# Patient Record
Sex: FEMALE | Race: OTHER | Employment: PART TIME | URBAN - METROPOLITAN AREA
[De-identification: names, ages, dates, MRNs, and addresses within clinical notes are randomized per-mention and may not be internally consistent; named-entity substitution may affect disease eponyms.]

---

## 2019-09-18 RX ORDER — CEFAZOLIN SODIUM/WATER 2 G/20 ML
2 SYRINGE (ML) INTRAVENOUS ONCE
Status: CANCELLED | OUTPATIENT
Start: 2019-09-23

## 2019-09-19 RX ORDER — HYDROCODONE BITARTRATE AND ACETAMINOPHEN 5; 325 MG/1; MG/1
1 TABLET ORAL EVERY 4 HOURS PRN
Status: ON HOLD | COMMUNITY
End: 2019-09-23

## 2019-09-19 RX ORDER — CEPHALEXIN 500 MG/1
500 CAPSULE ORAL 4 TIMES DAILY
COMMUNITY

## 2019-09-19 NOTE — H&P
ORTHO SURGERY H&P  Tonya Arana is a 21year old female. MRN is [id number]. CC: Right ankle pain    HPI: Ms. Mike Sotelo is a 25-year-old female training for the [employer] who presents complaining of right ankle pain.  She injured her right ankle in [place] AP, lateral, and mortise views demonstrate a fracture dislocation of the right ankle with lateral displacement of the talus and significant displacement of the fibula.     Post-reduction x-rays of the right ankle demonstrate significant improvement in the r

## 2019-09-23 ENCOUNTER — APPOINTMENT (OUTPATIENT)
Dept: GENERAL RADIOLOGY | Facility: HOSPITAL | Age: 24
End: 2019-09-23
Attending: ORTHOPAEDIC SURGERY
Payer: COMMERCIAL

## 2019-09-23 ENCOUNTER — HOSPITAL ENCOUNTER (OUTPATIENT)
Facility: HOSPITAL | Age: 24
Setting detail: HOSPITAL OUTPATIENT SURGERY
Discharge: HOME | End: 2019-09-23
Attending: ORTHOPAEDIC SURGERY | Admitting: ORTHOPAEDIC SURGERY
Payer: COMMERCIAL

## 2019-09-23 ENCOUNTER — ANESTHESIA (OUTPATIENT)
Dept: SURGERY | Facility: HOSPITAL | Age: 24
End: 2019-09-23
Payer: COMMERCIAL

## 2019-09-23 ENCOUNTER — ANESTHESIA EVENT (OUTPATIENT)
Dept: SURGERY | Facility: HOSPITAL | Age: 24
End: 2019-09-23
Payer: COMMERCIAL

## 2019-09-23 VITALS
BODY MASS INDEX: 45.2 KG/M2 | OXYGEN SATURATION: 95 % | SYSTOLIC BLOOD PRESSURE: 142 MMHG | DIASTOLIC BLOOD PRESSURE: 89 MMHG | HEART RATE: 91 BPM | TEMPERATURE: 98 F | HEIGHT: 67 IN | RESPIRATION RATE: 16 BRPM | WEIGHT: 288 LBS

## 2019-09-23 LAB
B-HCG UR QL: NEGATIVE
HCG SERPL QL: NEGATIVE

## 2019-09-23 PROCEDURE — 84703 CHORIONIC GONADOTROPIN ASSAY: CPT | Performed by: ORTHOPAEDIC SURGERY

## 2019-09-23 PROCEDURE — 0QSJ04Z REPOSITION RIGHT FIBULA WITH INTERNAL FIXATION DEVICE, OPEN APPROACH: ICD-10-PCS | Performed by: ORTHOPAEDIC SURGERY

## 2019-09-23 PROCEDURE — 36415 COLL VENOUS BLD VENIPUNCTURE: CPT | Performed by: ORTHOPAEDIC SURGERY

## 2019-09-23 PROCEDURE — 0SSF34Z REPOSITION RIGHT ANKLE JOINT WITH INTERNAL FIXATION DEVICE, PERCUTANEOUS APPROACH: ICD-10-PCS | Performed by: ORTHOPAEDIC SURGERY

## 2019-09-23 PROCEDURE — 76000 FLUOROSCOPY <1 HR PHYS/QHP: CPT | Performed by: ORTHOPAEDIC SURGERY

## 2019-09-23 PROCEDURE — 81025 URINE PREGNANCY TEST: CPT

## 2019-09-23 DEVICE — PLATE LCP TUB 1/3 8H 241.381: Type: IMPLANTABLE DEVICE | Site: ANKLE | Status: FUNCTIONAL

## 2019-09-23 DEVICE — IMPLANTABLE DEVICE: Type: IMPLANTABLE DEVICE | Site: ANKLE | Status: FUNCTIONAL

## 2019-09-23 DEVICE — SCREW CANC FT 4.0X12 206.012: Type: IMPLANTABLE DEVICE | Site: ANKLE | Status: FUNCTIONAL

## 2019-09-23 DEVICE — SCREW CANC FT 4.0X14 206.014: Type: IMPLANTABLE DEVICE | Site: ANKLE | Status: FUNCTIONAL

## 2019-09-23 RX ORDER — ACETAMINOPHEN 500 MG
1000 TABLET ORAL ONCE
Status: COMPLETED | OUTPATIENT
Start: 2019-09-23 | End: 2019-09-23

## 2019-09-23 RX ORDER — HYDROCODONE BITARTRATE AND ACETAMINOPHEN 5; 325 MG/1; MG/1
2 TABLET ORAL AS NEEDED
Status: DISCONTINUED | OUTPATIENT
Start: 2019-09-23 | End: 2019-09-23

## 2019-09-23 RX ORDER — MIDAZOLAM HYDROCHLORIDE 1 MG/ML
INJECTION INTRAMUSCULAR; INTRAVENOUS AS NEEDED
Status: DISCONTINUED | OUTPATIENT
Start: 2019-09-23 | End: 2019-09-23 | Stop reason: SURG

## 2019-09-23 RX ORDER — FAMOTIDINE 20 MG/1
20 TABLET ORAL ONCE
Status: COMPLETED | OUTPATIENT
Start: 2019-09-23 | End: 2019-09-23

## 2019-09-23 RX ORDER — MORPHINE SULFATE 10 MG/ML
6 INJECTION, SOLUTION INTRAMUSCULAR; INTRAVENOUS EVERY 10 MIN PRN
Status: DISCONTINUED | OUTPATIENT
Start: 2019-09-23 | End: 2019-09-23

## 2019-09-23 RX ORDER — PROCHLORPERAZINE EDISYLATE 5 MG/ML
5 INJECTION INTRAMUSCULAR; INTRAVENOUS ONCE AS NEEDED
Status: COMPLETED | OUTPATIENT
Start: 2019-09-23 | End: 2019-09-23

## 2019-09-23 RX ORDER — IBUPROFEN 600 MG/1
600 TABLET ORAL EVERY 8 HOURS PRN
Status: DISCONTINUED | OUTPATIENT
Start: 2019-09-25 | End: 2019-09-23

## 2019-09-23 RX ORDER — CLINDAMYCIN PHOSPHATE 150 MG/ML
INJECTION, SOLUTION INTRAVENOUS AS NEEDED
Status: DISCONTINUED | OUTPATIENT
Start: 2019-09-23 | End: 2019-09-23 | Stop reason: SURG

## 2019-09-23 RX ORDER — SODIUM CHLORIDE, SODIUM LACTATE, POTASSIUM CHLORIDE, CALCIUM CHLORIDE 600; 310; 30; 20 MG/100ML; MG/100ML; MG/100ML; MG/100ML
INJECTION, SOLUTION INTRAVENOUS CONTINUOUS
Status: DISCONTINUED | OUTPATIENT
Start: 2019-09-23 | End: 2019-09-23

## 2019-09-23 RX ORDER — MORPHINE SULFATE 4 MG/ML
4 INJECTION, SOLUTION INTRAMUSCULAR; INTRAVENOUS EVERY 10 MIN PRN
Status: DISCONTINUED | OUTPATIENT
Start: 2019-09-23 | End: 2019-09-23

## 2019-09-23 RX ORDER — CLINDAMYCIN PHOSPHATE 900 MG/50ML
900 INJECTION INTRAVENOUS ONCE
Status: DISCONTINUED | OUTPATIENT
Start: 2019-09-23 | End: 2019-09-23 | Stop reason: HOSPADM

## 2019-09-23 RX ORDER — SODIUM CHLORIDE 9 MG/ML
INJECTION, SOLUTION INTRAVENOUS CONTINUOUS PRN
Status: DISCONTINUED | OUTPATIENT
Start: 2019-09-23 | End: 2019-09-23 | Stop reason: SURG

## 2019-09-23 RX ORDER — OXYCODONE HYDROCHLORIDE AND ACETAMINOPHEN 5; 325 MG/1; MG/1
2 TABLET ORAL EVERY 4 HOURS PRN
Status: DISCONTINUED | OUTPATIENT
Start: 2019-09-23 | End: 2019-09-23

## 2019-09-23 RX ORDER — OXYCODONE HYDROCHLORIDE AND ACETAMINOPHEN 5; 325 MG/1; MG/1
1-2 TABLET ORAL EVERY 4 HOURS PRN
Qty: 25 TABLET | Refills: 0 | Status: SHIPPED | OUTPATIENT
Start: 2019-09-23

## 2019-09-23 RX ORDER — HYDROMORPHONE HYDROCHLORIDE 1 MG/ML
0.6 INJECTION, SOLUTION INTRAMUSCULAR; INTRAVENOUS; SUBCUTANEOUS EVERY 5 MIN PRN
Status: DISCONTINUED | OUTPATIENT
Start: 2019-09-23 | End: 2019-09-23

## 2019-09-23 RX ORDER — HYDROMORPHONE HYDROCHLORIDE 1 MG/ML
INJECTION, SOLUTION INTRAMUSCULAR; INTRAVENOUS; SUBCUTANEOUS AS NEEDED
Status: DISCONTINUED | OUTPATIENT
Start: 2019-09-23 | End: 2019-09-23 | Stop reason: SURG

## 2019-09-23 RX ORDER — NALOXONE HYDROCHLORIDE 0.4 MG/ML
80 INJECTION, SOLUTION INTRAMUSCULAR; INTRAVENOUS; SUBCUTANEOUS AS NEEDED
Status: DISCONTINUED | OUTPATIENT
Start: 2019-09-23 | End: 2019-09-23

## 2019-09-23 RX ORDER — ONDANSETRON 2 MG/ML
4 INJECTION INTRAMUSCULAR; INTRAVENOUS ONCE AS NEEDED
Status: COMPLETED | OUTPATIENT
Start: 2019-09-23 | End: 2019-09-23

## 2019-09-23 RX ORDER — HYDROCODONE BITARTRATE AND ACETAMINOPHEN 5; 325 MG/1; MG/1
1 TABLET ORAL AS NEEDED
Status: DISCONTINUED | OUTPATIENT
Start: 2019-09-23 | End: 2019-09-23

## 2019-09-23 RX ORDER — BUPIVACAINE HYDROCHLORIDE AND EPINEPHRINE 2.5; 5 MG/ML; UG/ML
INJECTION, SOLUTION INFILTRATION; PERINEURAL AS NEEDED
Status: DISCONTINUED | OUTPATIENT
Start: 2019-09-23 | End: 2019-09-23 | Stop reason: HOSPADM

## 2019-09-23 RX ORDER — METOCLOPRAMIDE 10 MG/1
10 TABLET ORAL ONCE
Status: COMPLETED | OUTPATIENT
Start: 2019-09-23 | End: 2019-09-23

## 2019-09-23 RX ORDER — HYDROMORPHONE HYDROCHLORIDE 1 MG/ML
0.4 INJECTION, SOLUTION INTRAMUSCULAR; INTRAVENOUS; SUBCUTANEOUS EVERY 5 MIN PRN
Status: DISCONTINUED | OUTPATIENT
Start: 2019-09-23 | End: 2019-09-23

## 2019-09-23 RX ORDER — DEXAMETHASONE SODIUM PHOSPHATE 4 MG/ML
VIAL (ML) INJECTION AS NEEDED
Status: DISCONTINUED | OUTPATIENT
Start: 2019-09-23 | End: 2019-09-23 | Stop reason: SURG

## 2019-09-23 RX ORDER — KETOROLAC TROMETHAMINE 30 MG/ML
30 INJECTION, SOLUTION INTRAMUSCULAR; INTRAVENOUS EVERY 6 HOURS PRN
Status: DISCONTINUED | OUTPATIENT
Start: 2019-09-23 | End: 2019-09-23

## 2019-09-23 RX ORDER — IBUPROFEN 600 MG/1
600 TABLET ORAL EVERY 8 HOURS PRN
Status: DISCONTINUED | OUTPATIENT
Start: 2019-09-23 | End: 2019-09-23 | Stop reason: SDUPTHER

## 2019-09-23 RX ORDER — HYDROMORPHONE HYDROCHLORIDE 1 MG/ML
0.2 INJECTION, SOLUTION INTRAMUSCULAR; INTRAVENOUS; SUBCUTANEOUS EVERY 5 MIN PRN
Status: DISCONTINUED | OUTPATIENT
Start: 2019-09-23 | End: 2019-09-23

## 2019-09-23 RX ORDER — MORPHINE SULFATE 4 MG/ML
2 INJECTION, SOLUTION INTRAMUSCULAR; INTRAVENOUS EVERY 10 MIN PRN
Status: DISCONTINUED | OUTPATIENT
Start: 2019-09-23 | End: 2019-09-23

## 2019-09-23 RX ORDER — ONDANSETRON 2 MG/ML
INJECTION INTRAMUSCULAR; INTRAVENOUS AS NEEDED
Status: DISCONTINUED | OUTPATIENT
Start: 2019-09-23 | End: 2019-09-23 | Stop reason: SURG

## 2019-09-23 RX ORDER — LIDOCAINE HYDROCHLORIDE 10 MG/ML
INJECTION, SOLUTION EPIDURAL; INFILTRATION; INTRACAUDAL; PERINEURAL AS NEEDED
Status: DISCONTINUED | OUTPATIENT
Start: 2019-09-23 | End: 2019-09-23 | Stop reason: SURG

## 2019-09-23 RX ADMIN — HYDROMORPHONE HYDROCHLORIDE 0.5 MG: 1 INJECTION, SOLUTION INTRAMUSCULAR; INTRAVENOUS; SUBCUTANEOUS at 15:07:00

## 2019-09-23 RX ADMIN — HYDROMORPHONE HYDROCHLORIDE 0.5 MG: 1 INJECTION, SOLUTION INTRAMUSCULAR; INTRAVENOUS; SUBCUTANEOUS at 15:12:00

## 2019-09-23 RX ADMIN — SODIUM CHLORIDE, SODIUM LACTATE, POTASSIUM CHLORIDE, CALCIUM CHLORIDE: 600; 310; 30; 20 INJECTION, SOLUTION INTRAVENOUS at 13:40:00

## 2019-09-23 RX ADMIN — SODIUM CHLORIDE, SODIUM LACTATE, POTASSIUM CHLORIDE, CALCIUM CHLORIDE: 600; 310; 30; 20 INJECTION, SOLUTION INTRAVENOUS at 15:12:00

## 2019-09-23 RX ADMIN — CLINDAMYCIN PHOSPHATE 900 MG: 150 INJECTION, SOLUTION INTRAVENOUS at 14:00:00

## 2019-09-23 RX ADMIN — MIDAZOLAM HYDROCHLORIDE 2 MG: 1 INJECTION INTRAMUSCULAR; INTRAVENOUS at 13:45:00

## 2019-09-23 RX ADMIN — DEXAMETHASONE SODIUM PHOSPHATE 4 MG: 4 MG/ML VIAL (ML) INJECTION at 13:59:00

## 2019-09-23 RX ADMIN — SODIUM CHLORIDE: 9 INJECTION, SOLUTION INTRAVENOUS at 15:12:00

## 2019-09-23 RX ADMIN — ONDANSETRON 4 MG: 2 INJECTION INTRAMUSCULAR; INTRAVENOUS at 13:59:00

## 2019-09-23 RX ADMIN — LIDOCAINE HYDROCHLORIDE 50 MG: 10 INJECTION, SOLUTION EPIDURAL; INFILTRATION; INTRACAUDAL; PERINEURAL at 13:49:00

## 2019-09-23 NOTE — OPERATIVE REPORT
Operative Note    Patient Name: Edgar Ayala    Preoperative Diagnosis: right ankle distal fibula fracture, displaced, syndesmotic ligament tear    Postoperative Diagnosis: right ankle distal fibula fracture, displaced, syndesmotic ligament tear    Primar

## 2019-09-23 NOTE — ANESTHESIA PREPROCEDURE EVALUATION
Anesthesia PreOp Note    HPI:     Alli Walker is a 21year old female who presents for preoperative consultation requested by: Steffi Heredia MD    Date of Surgery: 9/23/2019    Procedure(s):  ANKLE OPEN REDUCTION INTERNAL FIXATION  Indication: right ondansetron HCl (ZOFRAN) injection  Intravenous PRN Brain [place], CRNA 4 mg at 09/23/19 1359    clindamycin phosphate (CLEOCIN) injection   PRN [place], CRNA 900 mg at 09/23/19 1400    propofol (DIPRIVAN) infusion   Continuous PRN Brain [place], CRNA on file      Intimate partner violence:        Fear of current or ex partner: Not on file        Emotionally abused: Not on file        Physically abused: Not on file        Forced sexual activity: Not on file    Other Topics      Concerns:        Not on f D  9/23/2019 2:45 PM

## 2019-09-23 NOTE — ANESTHESIA POSTPROCEDURE EVALUATION
Patient: Denton Benson    Procedure Summary     Date:  09/23/19 Room / Location:  [facility]  / [facility]    Anesthesia Start:  9892 Anesthesia Stop:  9757    Procedure:  ANKLE OPEN REDUCTION INTERNAL FIXATION (Right Ankle) Diagnosis:  (right ankle di

## 2019-09-24 NOTE — OPERATIVE REPORT
[facility]    PATIENT'S NAME: [facility]   ATTENDING PHYSICIAN: Sina Lofton MD   OPERATING PHYSICIAN: Sina Lofton MD   PATIENT ACCOUNT#:   [id number]    LOCATION:  [facility] 3 [facility] 10  MEDICAL RECORD #:   [id number]       DATE longitudinal incision was made over the lateral aspect of the ankle over the distal fibula. Sharp dissection was carried out down to periosteal bone. The fracture site was identified and hematoma was removed from the fracture.   A bone-holding clamp was t portions of the procedure. There were no complications. We placed 20 mL of 0.5% Marcaine within the joint and around the incision laterally. Dictated By Alex Mosqueda.  Jaden Sen MD  d: 09/23/2019 14:44:29  t: 09/23/2019 22:00:19  [facility] [id number]  RACHIDO/C

## 2019-12-16 ENCOUNTER — HOSPITAL ENCOUNTER (EMERGENCY)
Facility: HOSPITAL | Age: 24
Discharge: HOME | End: 2019-12-16
Payer: COMMERCIAL

## 2019-12-16 VITALS
OXYGEN SATURATION: 99 % | BODY MASS INDEX: 35.36 KG/M2 | SYSTOLIC BLOOD PRESSURE: 162 MMHG | TEMPERATURE: 98 F | HEIGHT: 66 IN | RESPIRATION RATE: 18 BRPM | HEART RATE: 78 BPM | WEIGHT: 220 LBS | DIASTOLIC BLOOD PRESSURE: 66 MMHG
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
## 2019-12-16 DIAGNOSIS — S30.861A INSECT BITE OF ABDOMINAL WALL, INITIAL ENCOUNTER: Primary | ICD-10-CM

## 2019-12-16 PROCEDURE — 99283 EMERGENCY DEPT VISIT LOW MDM: CPT

## 2019-12-16 RX ORDER — CEPHALEXIN 500 MG/1
500 CAPSULE ORAL 4 TIMES DAILY
Qty: 28 CAPSULE | Refills: 0 | Status: SHIPPED | OUTPATIENT
Start: 2019-12-16 | End: 2019-12-23

## 2019-12-16 NOTE — ED PROVIDER NOTES
Patient Seen in: [facility] AND [facility] Emergency Department      History   Patient presents with:  Bite Sting,Insect    Stated Complaint: LLQ pain and skin abrasion.      HPI    24-year-old female presents to the emergency department with complaints of an itc refill takes less than 2 seconds. Comments:  There is a 1 cm annular erythematous lesion to the left lower abdomen suspicious for insect sting versus spider bite no abscess or fluctuance mild excoriation to the mid aspect of the wound from scratching

## 2019-12-16 NOTE — ED NOTES
Assumed care of patient from triage. Patient to ED from home for left abdominal wound. Patient states that she had been having the wound x2 days, which started off looking like a bruise.  Patient states that she started \"pcking at it\" today which made the

## 2022-02-01 ENCOUNTER — LAB REQUISITION (OUTPATIENT)
Dept: SURGERY | Age: 27
End: 2022-02-01
Payer: COMMERCIAL

## 2022-02-02 LAB — SARS-COV-2 RNA RESP QL NAA+PROBE: NOT DETECTED

## 2022-02-04 ENCOUNTER — LAB REQUISITION (OUTPATIENT)
Dept: SURGERY | Age: 27
End: 2022-02-04
Payer: COMMERCIAL

## 2022-02-04 PROCEDURE — 88304 TISSUE EXAM BY PATHOLOGIST: CPT | Performed by: SURGERY

## 2023-01-19 ENCOUNTER — PATIENT MESSAGE (OUTPATIENT)
Dept: INTERNAL MEDICINE CLINIC | Facility: CLINIC | Age: 28
End: 2023-01-19

## 2023-02-01 ENCOUNTER — LAB ENCOUNTER (OUTPATIENT)
Dept: LAB | Facility: HOSPITAL | Age: 28
End: 2023-02-01
Attending: FAMILY MEDICINE
Payer: COMMERCIAL

## 2023-02-01 ENCOUNTER — OFFICE VISIT (OUTPATIENT)
Dept: INTERNAL MEDICINE CLINIC | Facility: CLINIC | Age: 28
End: 2023-02-01
Payer: COMMERCIAL

## 2023-02-01 ENCOUNTER — LAB ENCOUNTER (OUTPATIENT)
Dept: LAB | Facility: REFERENCE LAB | Age: 28
End: 2023-02-01
Attending: FAMILY MEDICINE
Payer: COMMERCIAL

## 2023-02-01 VITALS
HEIGHT: 65.35 IN | TEMPERATURE: 98 F | OXYGEN SATURATION: 98 % | DIASTOLIC BLOOD PRESSURE: 70 MMHG | BODY MASS INDEX: 45.49 KG/M2 | WEIGHT: 276.38 LBS | HEART RATE: 82 BPM | SYSTOLIC BLOOD PRESSURE: 124 MMHG

## 2023-02-01 DIAGNOSIS — Z71.3 WEIGHT LOSS COUNSELING, ENCOUNTER FOR: Primary | ICD-10-CM

## 2023-02-01 DIAGNOSIS — Z71.3 WEIGHT LOSS COUNSELING, ENCOUNTER FOR: ICD-10-CM

## 2023-02-01 DIAGNOSIS — Z23 NEED FOR VACCINATION: ICD-10-CM

## 2023-02-01 DIAGNOSIS — Z00.00 HEALTHCARE MAINTENANCE: ICD-10-CM

## 2023-02-01 LAB
ALBUMIN SERPL-MCNC: 3.4 G/DL (ref 3.4–5)
ALBUMIN/GLOB SERPL: 0.7 {RATIO} (ref 1–2)
ALP LIVER SERPL-CCNC: 139 U/L
ALT SERPL-CCNC: 51 U/L
ANION GAP SERPL CALC-SCNC: 7 MMOL/L (ref 0–18)
AST SERPL-CCNC: 23 U/L (ref 15–37)
ATRIAL RATE: 75 BPM
BASOPHILS # BLD AUTO: 0.05 X10(3) UL (ref 0–0.2)
BASOPHILS NFR BLD AUTO: 0.6 %
BILIRUB SERPL-MCNC: 0.3 MG/DL (ref 0.1–2)
BUN BLD-MCNC: 11 MG/DL (ref 7–18)
BUN/CREAT SERPL: 15.7 (ref 10–20)
CALCIUM BLD-MCNC: 9.1 MG/DL (ref 8.5–10.1)
CHLORIDE SERPL-SCNC: 106 MMOL/L (ref 98–112)
CHOLEST SERPL-MCNC: 167 MG/DL (ref ?–200)
CO2 SERPL-SCNC: 27 MMOL/L (ref 21–32)
CREAT BLD-MCNC: 0.7 MG/DL
DEPRECATED RDW RBC AUTO: 38.8 FL (ref 35.1–46.3)
EOSINOPHIL # BLD AUTO: 0.37 X10(3) UL (ref 0–0.7)
EOSINOPHIL NFR BLD AUTO: 4.2 %
ERYTHROCYTE [DISTWIDTH] IN BLOOD BY AUTOMATED COUNT: 12.2 % (ref 11–15)
EST. AVERAGE GLUCOSE BLD GHB EST-MCNC: 100 MG/DL (ref 68–126)
FASTING PATIENT LIPID ANSWER: YES
FASTING STATUS PATIENT QL REPORTED: YES
GFR SERPLBLD BASED ON 1.73 SQ M-ARVRAT: 121 ML/MIN/1.73M2 (ref 60–?)
GLOBULIN PLAS-MCNC: 4.6 G/DL (ref 2.8–4.4)
GLUCOSE BLD-MCNC: 109 MG/DL (ref 70–99)
HBA1C MFR BLD: 5.1 % (ref ?–5.7)
HCT VFR BLD AUTO: 41.2 %
HDLC SERPL-MCNC: 38 MG/DL (ref 40–59)
HGB BLD-MCNC: 14.1 G/DL
IMM GRANULOCYTES # BLD AUTO: 0.03 X10(3) UL (ref 0–1)
IMM GRANULOCYTES NFR BLD: 0.3 %
LDLC SERPL CALC-MCNC: 95 MG/DL (ref ?–100)
LYMPHOCYTES # BLD AUTO: 2.45 X10(3) UL (ref 1–4)
LYMPHOCYTES NFR BLD AUTO: 27.7 %
MCH RBC QN AUTO: 29.6 PG (ref 26–34)
MCHC RBC AUTO-ENTMCNC: 34.2 G/DL (ref 31–37)
MCV RBC AUTO: 86.6 FL
MONOCYTES # BLD AUTO: 0.52 X10(3) UL (ref 0.1–1)
MONOCYTES NFR BLD AUTO: 5.9 %
NEUTROPHILS # BLD AUTO: 5.44 X10 (3) UL (ref 1.5–7.7)
NEUTROPHILS # BLD AUTO: 5.44 X10(3) UL (ref 1.5–7.7)
NEUTROPHILS NFR BLD AUTO: 61.3 %
NONHDLC SERPL-MCNC: 129 MG/DL (ref ?–130)
OSMOLALITY SERPL CALC.SUM OF ELEC: 290 MOSM/KG (ref 275–295)
P AXIS: 28 DEGREES
P-R INTERVAL: 164 MS
PLATELET # BLD AUTO: 310 10(3)UL (ref 150–450)
POTASSIUM SERPL-SCNC: 3.4 MMOL/L (ref 3.5–5.1)
PROT SERPL-MCNC: 8 G/DL (ref 6.4–8.2)
Q-T INTERVAL: 370 MS
QRS DURATION: 84 MS
QTC CALCULATION (BEZET): 413 MS
R AXIS: 45 DEGREES
RBC # BLD AUTO: 4.76 X10(6)UL
SODIUM SERPL-SCNC: 140 MMOL/L (ref 136–145)
T AXIS: 16 DEGREES
TRIGL SERPL-MCNC: 197 MG/DL (ref 30–149)
TSI SER-ACNC: 1.13 MIU/ML (ref 0.36–3.74)
VENTRICULAR RATE: 75 BPM
VLDLC SERPL CALC-MCNC: 32 MG/DL (ref 0–30)
WBC # BLD AUTO: 8.9 X10(3) UL (ref 4–11)

## 2023-02-01 PROCEDURE — 93010 ELECTROCARDIOGRAM REPORT: CPT | Performed by: INTERNAL MEDICINE

## 2023-02-01 PROCEDURE — 3074F SYST BP LT 130 MM HG: CPT | Performed by: FAMILY MEDICINE

## 2023-02-01 PROCEDURE — 80061 LIPID PANEL: CPT

## 2023-02-01 PROCEDURE — 93005 ELECTROCARDIOGRAM TRACING: CPT

## 2023-02-01 PROCEDURE — 3078F DIAST BP <80 MM HG: CPT | Performed by: FAMILY MEDICINE

## 2023-02-01 PROCEDURE — 90686 IIV4 VACC NO PRSV 0.5 ML IM: CPT | Performed by: FAMILY MEDICINE

## 2023-02-01 PROCEDURE — 36415 COLL VENOUS BLD VENIPUNCTURE: CPT

## 2023-02-01 PROCEDURE — 84443 ASSAY THYROID STIM HORMONE: CPT

## 2023-02-01 PROCEDURE — 85025 COMPLETE CBC W/AUTO DIFF WBC: CPT | Performed by: FAMILY MEDICINE

## 2023-02-01 PROCEDURE — 83036 HEMOGLOBIN GLYCOSYLATED A1C: CPT

## 2023-02-01 PROCEDURE — 80053 COMPREHEN METABOLIC PANEL: CPT

## 2023-02-01 PROCEDURE — 3008F BODY MASS INDEX DOCD: CPT | Performed by: FAMILY MEDICINE

## 2023-02-01 PROCEDURE — 99204 OFFICE O/P NEW MOD 45 MIN: CPT | Performed by: FAMILY MEDICINE

## 2023-02-01 RX ORDER — LORATADINE 10 MG/1
10 TABLET ORAL DAILY
COMMUNITY

## 2023-02-10 NOTE — TELEPHONE ENCOUNTER
Fax received from [place] requesting PA submission for Phentermine 15 mg Rx.   PA form submitted to [facility] via fax ( [phone])

## 2023-02-21 ENCOUNTER — TELEPHONE (OUTPATIENT)
Dept: INTERNAL MEDICINE CLINIC | Facility: CLINIC | Age: 28
End: 2023-02-21

## 2023-02-21 NOTE — TELEPHONE ENCOUNTER
Fax received from [employer] re:  Notice of Prior Auth denial for Phentermine 15 mg capsules Rx. [employer] Message sent to patient re: denial by insurance  As well as option to use [facility] and purchase out of pocket.

## 2023-03-02 ENCOUNTER — OFFICE VISIT (OUTPATIENT)
Dept: SURGERY | Facility: CLINIC | Age: 28
End: 2023-03-02
Payer: COMMERCIAL

## 2023-03-02 ENCOUNTER — OFFICE VISIT (OUTPATIENT)
Dept: INTERNAL MEDICINE CLINIC | Facility: CLINIC | Age: 28
End: 2023-03-02
Payer: COMMERCIAL

## 2023-03-02 VITALS
WEIGHT: 274 LBS | BODY MASS INDEX: 46.21 KG/M2 | DIASTOLIC BLOOD PRESSURE: 86 MMHG | HEIGHT: 64.4 IN | SYSTOLIC BLOOD PRESSURE: 127 MMHG | HEART RATE: 98 BPM | OXYGEN SATURATION: 99 %

## 2023-03-02 VITALS
OXYGEN SATURATION: 98 % | SYSTOLIC BLOOD PRESSURE: 122 MMHG | TEMPERATURE: 98 F | BODY MASS INDEX: 45.43 KG/M2 | HEIGHT: 65.35 IN | HEART RATE: 74 BPM | WEIGHT: 276 LBS | DIASTOLIC BLOOD PRESSURE: 78 MMHG

## 2023-03-02 DIAGNOSIS — E66.01 MORBID OBESITY WITH BMI OF 45.0-49.9, ADULT (HCC): ICD-10-CM

## 2023-03-02 DIAGNOSIS — J45.990 EXERCISE INDUCED BRONCHOSPASM: ICD-10-CM

## 2023-03-02 DIAGNOSIS — J30.2 SEASONAL ALLERGIC RHINITIS, UNSPECIFIED TRIGGER: ICD-10-CM

## 2023-03-02 DIAGNOSIS — E78.5 DYSLIPIDEMIA: Primary | ICD-10-CM

## 2023-03-02 DIAGNOSIS — R06.83 SNORING: ICD-10-CM

## 2023-03-02 DIAGNOSIS — R63.2 BINGE EATING: ICD-10-CM

## 2023-03-02 DIAGNOSIS — Z00.00 WELLNESS EXAMINATION: Primary | ICD-10-CM

## 2023-03-02 DIAGNOSIS — Z12.4 PAP SMEAR FOR CERVICAL CANCER SCREENING: ICD-10-CM

## 2023-03-02 DIAGNOSIS — Z51.81 ENCOUNTER FOR THERAPEUTIC DRUG MONITORING: ICD-10-CM

## 2023-03-02 PROCEDURE — 3079F DIAST BP 80-89 MM HG: CPT | Performed by: NURSE PRACTITIONER

## 2023-03-02 PROCEDURE — 88175 CYTOPATH C/V AUTO FLUID REDO: CPT | Performed by: FAMILY MEDICINE

## 2023-03-02 PROCEDURE — 3008F BODY MASS INDEX DOCD: CPT | Performed by: NURSE PRACTITIONER

## 2023-03-02 PROCEDURE — 99212 OFFICE O/P EST SF 10 MIN: CPT | Performed by: FAMILY MEDICINE

## 2023-03-02 PROCEDURE — 3074F SYST BP LT 130 MM HG: CPT | Performed by: FAMILY MEDICINE

## 2023-03-02 PROCEDURE — 99395 PREV VISIT EST AGE 18-39: CPT | Performed by: FAMILY MEDICINE

## 2023-03-02 PROCEDURE — 3078F DIAST BP <80 MM HG: CPT | Performed by: FAMILY MEDICINE

## 2023-03-02 PROCEDURE — 3008F BODY MASS INDEX DOCD: CPT | Performed by: FAMILY MEDICINE

## 2023-03-02 PROCEDURE — 99204 OFFICE O/P NEW MOD 45 MIN: CPT | Performed by: NURSE PRACTITIONER

## 2023-03-02 PROCEDURE — 3074F SYST BP LT 130 MM HG: CPT | Performed by: NURSE PRACTITIONER

## 2023-03-02 RX ORDER — FLUTICASONE PROPIONATE 50 MCG
2 SPRAY, SUSPENSION (ML) NASAL DAILY
Qty: 1 EACH | Refills: 0 | Status: SHIPPED | OUTPATIENT
Start: 2023-03-02 | End: 2024-02-25

## 2023-03-02 RX ORDER — ALBUTEROL SULFATE 90 UG/1
2 AEROSOL, METERED RESPIRATORY (INHALATION) EVERY 6 HOURS PRN
Qty: 18 G | Refills: 0 | Status: SHIPPED | OUTPATIENT
Start: 2023-03-02

## 2023-03-02 RX ORDER — MONTELUKAST SODIUM 10 MG/1
10 TABLET ORAL NIGHTLY PRN
Qty: 90 TABLET | Refills: 1 | Status: SHIPPED | OUTPATIENT
Start: 2023-03-02

## 2023-03-02 NOTE — PATIENT INSTRUCTIONS
Continue phentermine. [facility] schedule:    0.25 mg/week x 4 weeks  0.5 mg/week x 4 weeks  1 mg/week x 4 weeks  1.7 mg/week x 4 weeks  2.4 mg/week thereafter    I recommend a whole food, plant powered diet with low glycemic index:     Aim for 3 meals a day and 1-2 snacks as needed. Aim for a protein + produce at each meal time. Keep meals between 7 am and 7 pm.     Breakfast ideas:  1. Fruit and nuts/seeds. 2. Eggs scrambled with vegetables. 3. Oatmeal (stovetop), cinnamon, 2 tbsp flaxseed, berries, nuts. 4. Protein shake + fruit. (1 scoop with water/ice). [employer] , [place], [place], and [place] are good brands. Snacks:  Raw vegetables and hummus, apples and peanut butter, nuts, seeds, fruit, pecans drizzled with organic honey. Use the Healthy Plate method for lunch and dinner:  1/2 right side of plate non-starchy vegetables. Bottom left 1/4 plate protein. Top left 1/4 starch as desired. Aim for a total of:  2 fruits a day (avocado, tomato, citrus/oranges, apples, berries)  1/2 cup or medium size    4 non-starchy vegetables (handful) (greens, peppers, onions, garlic, broccoli, cauliflower, etc.)    0-2 starches (oatmeal, sweet potato, carrots, brown rice, etc). 3 protein per day (fish, seafood, meat, or plants: salmon, nuts, seeds, shrimp, chicken, turkey, beans, lentils, chickpeas, etc).     2 healthy fats (avocado, avocado oil, olives, olive oil, salmon, nuts, seeds)

## 2023-03-23 ENCOUNTER — TELEPHONE (OUTPATIENT)
Dept: SURGERY | Facility: CLINIC | Age: 28
End: 2023-03-23

## 2023-04-04 ENCOUNTER — TELEPHONE (OUTPATIENT)
Dept: SURGERY | Facility: CLINIC | Age: 28
End: 2023-04-04

## 2023-05-17 ENCOUNTER — TELEMEDICINE (OUTPATIENT)
Dept: SURGERY | Facility: CLINIC | Age: 28
End: 2023-05-17
Payer: COMMERCIAL

## 2023-05-17 ENCOUNTER — TELEPHONE (OUTPATIENT)
Dept: SURGERY | Facility: CLINIC | Age: 28
End: 2023-05-17

## 2023-05-17 VITALS — WEIGHT: 267 LBS | BODY MASS INDEX: 45 KG/M2

## 2023-05-17 DIAGNOSIS — R06.83 SNORING: ICD-10-CM

## 2023-05-17 DIAGNOSIS — R63.2 BINGE EATING: ICD-10-CM

## 2023-05-17 DIAGNOSIS — Z51.81 ENCOUNTER FOR THERAPEUTIC DRUG MONITORING: ICD-10-CM

## 2023-05-17 DIAGNOSIS — E78.5 DYSLIPIDEMIA: Primary | ICD-10-CM

## 2023-05-17 DIAGNOSIS — E66.01 MORBID OBESITY WITH BMI OF 45.0-49.9, ADULT (HCC): ICD-10-CM

## 2023-05-17 PROCEDURE — 99214 OFFICE O/P EST MOD 30 MIN: CPT | Performed by: NURSE PRACTITIONER

## 2023-05-17 RX ORDER — PHENTERMINE HYDROCHLORIDE 37.5 MG/1
37.5 TABLET ORAL
Qty: 30 TABLET | Refills: 3 | Status: SHIPPED | OUTPATIENT
Start: 2023-05-17

## 2023-05-17 NOTE — PATIENT INSTRUCTIONS
Attend bariatric seminar. To schedule bariatric seminar:   Call [phone] or   Schedule Online at- [email or web address]    After you attend seminar, please reach out to the surgeon's office to have your insurance benefits verified. The number to call is [phone] and the office can direct you from there. The surgeon's office will not verify your benefits until you have attended seminar. Please do not call the surgeon until after seminar.

## 2023-06-07 RX ORDER — TOPIRAMATE 25 MG/1
25 TABLET ORAL DAILY
Qty: 30 TABLET | Refills: 2 | Status: SHIPPED | OUTPATIENT
Start: 2023-06-07

## 2023-06-23 DIAGNOSIS — R63.2 BINGE EATING: ICD-10-CM

## 2023-06-23 DIAGNOSIS — E78.5 DYSLIPIDEMIA: Primary | ICD-10-CM

## 2023-06-23 DIAGNOSIS — R06.83 SNORING: ICD-10-CM

## 2023-06-23 DIAGNOSIS — E66.01 MORBID OBESITY WITH BMI OF 45.0-49.9, ADULT (HCC): ICD-10-CM

## 2023-11-01 ENCOUNTER — TELEPHONE (OUTPATIENT)
Dept: INTERNAL MEDICINE CLINIC | Facility: CLINIC | Age: 28
End: 2023-11-01

## 2023-11-01 NOTE — TELEPHONE ENCOUNTER
Pt called stating that she has been noticing a little bit of blood on her stools  Pt stated that it does hurt when she goes to have a bowel  Pt would like to be seen soon  Please advise

## 2023-11-01 NOTE — TELEPHONE ENCOUNTER
Appointment scheduled for Monday patient was offered an appointment for tomorrow but states she can not call off work

## 2023-12-04 ENCOUNTER — OFFICE VISIT (OUTPATIENT)
Dept: INTERNAL MEDICINE CLINIC | Facility: CLINIC | Age: 28
End: 2023-12-04
Payer: COMMERCIAL

## 2023-12-04 VITALS
HEIGHT: 64.4 IN | BODY MASS INDEX: 46.27 KG/M2 | SYSTOLIC BLOOD PRESSURE: 120 MMHG | DIASTOLIC BLOOD PRESSURE: 82 MMHG | OXYGEN SATURATION: 100 % | WEIGHT: 274.38 LBS | HEART RATE: 85 BPM

## 2023-12-04 DIAGNOSIS — N89.8 VAGINAL ITCHING: ICD-10-CM

## 2023-12-04 DIAGNOSIS — R39.9 URINARY SYMPTOM OR SIGN: Primary | ICD-10-CM

## 2023-12-04 LAB
BILIRUB UR QL: NEGATIVE
COLOR UR: YELLOW
GLUCOSE UR-MCNC: NORMAL MG/DL
KETONES UR-MCNC: NEGATIVE MG/DL
LEUKOCYTE ESTERASE UR QL STRIP.AUTO: NEGATIVE
PH UR: 6 [PH] (ref 5–8)
SP GR UR STRIP: 1.03 (ref 1–1.03)
UROBILINOGEN UR STRIP-ACNC: NORMAL

## 2023-12-04 PROCEDURE — 81001 URINALYSIS AUTO W/SCOPE: CPT | Performed by: FAMILY MEDICINE

## 2023-12-04 PROCEDURE — 87186 SC STD MICRODIL/AGAR DIL: CPT | Performed by: FAMILY MEDICINE

## 2023-12-04 PROCEDURE — 87184 SC STD DISK METHOD PER PLATE: CPT | Performed by: FAMILY MEDICINE

## 2023-12-04 PROCEDURE — 3079F DIAST BP 80-89 MM HG: CPT | Performed by: FAMILY MEDICINE

## 2023-12-04 PROCEDURE — 87086 URINE CULTURE/COLONY COUNT: CPT | Performed by: FAMILY MEDICINE

## 2023-12-04 PROCEDURE — 87077 CULTURE AEROBIC IDENTIFY: CPT | Performed by: FAMILY MEDICINE

## 2023-12-04 PROCEDURE — 3074F SYST BP LT 130 MM HG: CPT | Performed by: FAMILY MEDICINE

## 2023-12-04 PROCEDURE — 3008F BODY MASS INDEX DOCD: CPT | Performed by: FAMILY MEDICINE

## 2023-12-04 PROCEDURE — 99214 OFFICE O/P EST MOD 30 MIN: CPT | Performed by: FAMILY MEDICINE

## 2023-12-04 RX ORDER — FLUCONAZOLE 150 MG/1
150 TABLET ORAL ONCE
Qty: 2 TABLET | Refills: 0 | Status: SHIPPED | OUTPATIENT
Start: 2023-12-04 | End: 2023-12-04

## 2024-03-11 ENCOUNTER — APPOINTMENT (OUTPATIENT)
Dept: GENERAL RADIOLOGY | Age: 29
End: 2024-03-11
Attending: NURSE PRACTITIONER
Payer: COMMERCIAL

## 2024-03-11 ENCOUNTER — HOSPITAL ENCOUNTER (OUTPATIENT)
Age: 29
Discharge: HOME | End: 2024-03-11
Payer: COMMERCIAL

## 2024-03-11 VITALS
DIASTOLIC BLOOD PRESSURE: 73 MMHG | TEMPERATURE: 97 F | SYSTOLIC BLOOD PRESSURE: 129 MMHG | RESPIRATION RATE: 16 BRPM | HEART RATE: 93 BPM | OXYGEN SATURATION: 100 %

## 2024-03-11 DIAGNOSIS — B34.9 VIRAL SYNDROME: ICD-10-CM

## 2024-03-11 DIAGNOSIS — R05.9 COUGH: Primary | ICD-10-CM

## 2024-03-11 DIAGNOSIS — B97.89 VIRAL LARYNGITIS: ICD-10-CM

## 2024-03-11 DIAGNOSIS — J04.0 VIRAL LARYNGITIS: ICD-10-CM

## 2024-03-11 DIAGNOSIS — Z76.0 ENCOUNTER FOR MEDICATION REFILL: ICD-10-CM

## 2024-03-11 LAB
S PYO AG THROAT QL: NEGATIVE
SARS-COV-2 RNA RESP QL NAA+PROBE: NOT DETECTED

## 2024-03-11 PROCEDURE — 99204 OFFICE O/P NEW MOD 45 MIN: CPT | Performed by: NURSE PRACTITIONER

## 2024-03-11 PROCEDURE — 87880 STREP A ASSAY W/OPTIC: CPT | Performed by: NURSE PRACTITIONER

## 2024-03-11 PROCEDURE — 71046 X-RAY EXAM CHEST 2 VIEWS: CPT | Performed by: NURSE PRACTITIONER

## 2024-03-11 PROCEDURE — 94640 AIRWAY INHALATION TREATMENT: CPT | Performed by: NURSE PRACTITIONER

## 2024-03-11 PROCEDURE — U0002 COVID-19 LAB TEST NON-CDC: HCPCS | Performed by: NURSE PRACTITIONER

## 2024-03-11 RX ORDER — ALBUTEROL SULFATE 2.5 MG/3ML
2.5 SOLUTION RESPIRATORY (INHALATION) EVERY 8 HOURS PRN
Qty: 30 EACH | Refills: 0 | Status: SHIPPED | OUTPATIENT
Start: 2024-03-11 | End: 2024-04-10

## 2024-03-11 RX ORDER — FLUTICASONE PROPIONATE 50 MCG
2 SPRAY, SUSPENSION (ML) NASAL DAILY
Qty: 16 G | Refills: 0 | Status: SHIPPED | OUTPATIENT
Start: 2024-03-11 | End: 2024-04-10

## 2024-03-11 RX ORDER — ALBUTEROL SULFATE 90 UG/1
2 AEROSOL, METERED RESPIRATORY (INHALATION) EVERY 6 HOURS PRN
Qty: 1 EACH | Refills: 0 | Status: SHIPPED | OUTPATIENT
Start: 2024-03-11 | End: 2024-04-10

## 2024-03-11 RX ORDER — IPRATROPIUM BROMIDE AND ALBUTEROL SULFATE 2.5; .5 MG/3ML; MG/3ML
3 SOLUTION RESPIRATORY (INHALATION) ONCE
Status: COMPLETED | OUTPATIENT
Start: 2024-03-11 | End: 2024-03-11

## 2024-03-11 RX ORDER — BENZONATATE 100 MG/1
100 CAPSULE ORAL 3 TIMES DAILY PRN
Qty: 30 CAPSULE | Refills: 0 | Status: SHIPPED | OUTPATIENT
Start: 2024-03-11 | End: 2024-04-10

## 2024-03-11 NOTE — ED INITIAL ASSESSMENT (HPI)
Pt was recently sick for about 3 weeks. In the past week, patient reports being more short of breath, productive cough, and sore throat. Pt has been attempting inhaler and neb treatments with slight relief. Denies fevers or chills currently.

## 2024-03-11 NOTE — DISCHARGE INSTRUCTIONS
Viral infections are usually the worst days 3-5, but can last up to 14 days.  You may develop or continue to cough for up to 3 weeks after.  Viral infections do not improve with the use of antibiotics.  [place] diet, increase water intake; gatorade or pedialyte   Runny Nose/Congestion:  Humidifier at bedside   Vicks vaporub under nose and chest wall  - Nasal Rinse ([place])  - Flonase nasal spray daily or twice daily  - Antihistamine (Zyrtec, Claritin) 10mg daily over the counter  Cough:  - Mucinex OR Robitussin DM  Tessalon perles three times a day  Albuterol nebx every 6 hours while awake, OR inhaler 2 puffs every 6 hours for cough, bronchospasm  - Warm tea w/honey  - Humidifier in bedroom at night  Sore Throat:  - Salt water gargle  - Ibuprofen every 6-8 hours as needed for pain  -Avoid spicy acidic foods as acid reflux production can cause throat symptoms too  -Voice Rest!  Fever:  Tylenol or Motrin every 6 hours for fever > 100.4. You can alternate between the two as well if fever high.  Follow up with your primary care provider in the next 2-3 days.  Go to the emergency room with:  - Fever > 102 that does not respond to medications.  - Shortness of breath, difficulty breathing.  - Chest pain.  - Vomiting and unable to keep down fluids or medications

## 2024-03-11 NOTE — ED PROVIDER NOTES
Patient Seen in: [facility]      History     Chief Complaint   Patient presents with    Difficulty Breathing     Sore throat and cough - Entered by patient     Stated Complaint: Difficulty Breathing - Sore throat and cough    Subjective:   HPI    28 yr old female here for evaluation of a hoarse voice x 1.5 weeks, cough, runny nose, sore throat, and congestion x 3 weeks ago. She feels a bit short of breath with exertion, her cough is more dry, with some post tussive emesis. She has had some nausea with coughing so much. She denies fever or chills, sweats, vomiting or diarrhea. She has been trying nebulizers and inhaler with only slight relief. Denies sick contacts or travel. She denies OCP use, smoking, blood thinner use.     Objective:   Past Medical History:   Diagnosis Date    Visual impairment     eyeglasses              Past Surgical History:   Procedure Laterality Date    ANKLE FRACTURE SURGERY Right     CHOLECYSTECTOMY      OTHER      lipoma removal from left lower back                Social History     Socioeconomic History    Marital status: Single   Tobacco Use    Smoking status: Never    Smokeless tobacco: Never   Substance and Sexual Activity    Alcohol use: Yes    Drug use: Never              Review of Systems    Positive for stated complaint: Difficulty Breathing - Sore throat and cough  Other systems are as noted in HPI.  Constitutional and vital signs reviewed.      All other systems reviewed and negative except as noted above.    Physical Exam     ED Triage Vitals [03/11/24 0939]   /73   Pulse 93   Resp 16   Temp 97.4 °F (36.3 °C)   Temp src Temporal   SpO2 100 %   O2 Device None (Room air)       Current:/73   Pulse 93   Temp 97.4 °F (36.3 °C) (Temporal)   Resp 16   LMP 02/13/2024   SpO2 100%         Physical Exam  Vitals and nursing note reviewed.   Constitutional:       General: She is not in acute distress.     Appearance: She is well-developed. She is ill-appearing  (fatigued). She is not toxic-appearing or diaphoretic.      Interventions: She is not intubated.  HENT:      Head: Normocephalic and atraumatic.      Mouth/Throat:      Mouth: Mucous membranes are moist.      Pharynx: Oropharynx is clear. No pharyngeal swelling or oropharyngeal exudate.   Cardiovascular:      Rate and Rhythm: Normal rate and regular rhythm.   Pulmonary:      Effort: Pulmonary effort is normal. No tachypnea, bradypnea, accessory muscle usage or respiratory distress. She is not intubated.      Breath sounds: Normal breath sounds. Decreased air movement present. No stridor or transmitted upper airway sounds. No decreased breath sounds, wheezing, rhonchi or rales.   Chest:      Chest wall: No tenderness.   Abdominal:      General: Bowel sounds are normal.      Palpations: Abdomen is soft.      Tenderness: There is no abdominal tenderness. There is no guarding or rebound.   Musculoskeletal:         General: Normal range of motion.      Cervical back: Normal range of motion and neck supple.   Lymphadenopathy:      Cervical: No cervical adenopathy.   Skin:     General: Skin is warm.   Neurological:      Mental Status: She is alert and oriented to person, place, and time.   Psychiatric:         Mood and Affect: Mood normal.         Behavior: Behavior normal.               ED Course     Labs Reviewed   POCT RAPID STREP - Normal   RAPID SARS-COV-2 BY PCR - Normal          ED Course as of 03/13/24 1827  ------------------------------------------------------------  Time: 03/11 1005  Value: POCT Rapid Strep  Comment: (Reviewed)  ------------------------------------------------------------  Time: 03/11 1005  Value: Rapid SARS-CoV-2 by PCR  Comment: (Reviewed)  ------------------------------------------------------------  Time: 03/11 1024  Value: XR CHEST PA + LAT CHEST ([id number])  Comment: Impression  CONCLUSION: No acute cardiopulmonary abnormality                MDM     28-year-old female here for evaluation  of continued cough, laryngitis symptoms  No fevers, shortness of breath, abdominal pain vomiting or diarrhea.    On exam patient fatigued appearing,  coarse lung sounds throughout with decreased air movement. no wheezing stridor or crackles, bilateral TM normal, pharynx clear with no erythema or swelling.  Soft nontender abdomen.    Differential diagnosis includes strep versus COVID versus pneumonia versus other viral syndrome    Chest x-ray added, DuoNeb added if COVID is negative for better air movement.  Rapid strep is negative  COVID-negative    Chest x-ray = I do not see any obvious opacities or abnormalities on chest x-ray when I view image.  Rad read = negative    Discussed results with patient.  She is feeling a bit better with nebulizer.  Discussed continued good p.o. intake including warm teas, salt water gargles for sore throat, ibuprofen or Tylenol.  Discussed albuterol use, inhaler and nebulizer refill sent to pharmacy on file.  Discussed Tessalon Perles for cough and Flonase nasal spray to help with sinus congestion, postnasal drip that can be causing sore throat and cough.    Vitals are stable at this time, patient is stable nontoxic for discharge home  All questions answered. Return and ER precautions given.    Counseled: Patient, regarding diagnosis, regarding treatment plan, regarding diagnostic results, regarding prescription, I have discussed with the patient the results of tests, differential diagnosis, and warning signs and symptoms that should prompt immediate return. The patient understands these instructions and agrees to the follow-up plan provided. There is no barriers to learning. Appropriate f/u given. Patient agrees to return for any concerns/ problems/complications.                                     Medical Decision Making      Disposition and Plan     Clinical Impression:  1. Cough    2. Viral syndrome    3. Viral laryngitis    4. Encounter for medication refill          Disposition:  Discharge  3/11/2024 10:40 am    Follow-up:  Estela Alfonso MD  [address] [facility] [address]  [facility] [address]  [phone]    Schedule an appointment as soon as possible for a visit in 1 week  If symptoms worsen          Medications Prescribed:  Discharge Medication List as of 3/11/2024 10:41 AM        START taking these medications    Details   benzonatate 100 MG Oral Cap Take 1 capsule (100 mg total) by mouth 3 (three) times daily as needed for cough., Normal, Disp-30 capsule, R-0      !! albuterol 108 (90 Base) MCG/ACT Inhalation Aero Soln Inhale 2 puffs into the lungs every 6 (six) hours as needed for Shortness of Breath or Wheezing., Normal, Disp-1 each, R-0      albuterol (2.5 MG/3ML) 0.083% Inhalation Nebu Soln Take 3 mL (2.5 mg total) by nebulization every 8 (eight) hours as needed for Wheezing or Shortness of Breath., Normal, Disp-30 each, R-0       !! - Potential duplicate medications found. Please discuss with provider.

## 2024-04-11 ENCOUNTER — OFFICE VISIT (OUTPATIENT)
Dept: SURGERY | Facility: CLINIC | Age: 29
End: 2024-04-11
Payer: COMMERCIAL

## 2024-04-11 VITALS
DIASTOLIC BLOOD PRESSURE: 80 MMHG | SYSTOLIC BLOOD PRESSURE: 126 MMHG | OXYGEN SATURATION: 98 % | HEART RATE: 81 BPM | HEIGHT: 64.4 IN | WEIGHT: 281 LBS | BODY MASS INDEX: 47.39 KG/M2

## 2024-04-11 DIAGNOSIS — R06.83 SNORING: ICD-10-CM

## 2024-04-11 DIAGNOSIS — E66.01 MORBID OBESITY WITH BMI OF 45.0-49.9, ADULT (HCC): ICD-10-CM

## 2024-04-11 DIAGNOSIS — E78.5 DYSLIPIDEMIA: Primary | ICD-10-CM

## 2024-04-11 DIAGNOSIS — Z51.81 ENCOUNTER FOR THERAPEUTIC DRUG MONITORING: ICD-10-CM

## 2024-04-11 DIAGNOSIS — R63.2 BINGE EATING: ICD-10-CM

## 2024-04-11 PROCEDURE — 3079F DIAST BP 80-89 MM HG: CPT | Performed by: NURSE PRACTITIONER

## 2024-04-11 PROCEDURE — 99214 OFFICE O/P EST MOD 30 MIN: CPT | Performed by: NURSE PRACTITIONER

## 2024-04-11 PROCEDURE — 3074F SYST BP LT 130 MM HG: CPT | Performed by: NURSE PRACTITIONER

## 2024-04-11 PROCEDURE — 3008F BODY MASS INDEX DOCD: CPT | Performed by: NURSE PRACTITIONER

## 2024-04-11 RX ORDER — TIRZEPATIDE 2.5 MG/.5ML
2.5 INJECTION, SOLUTION SUBCUTANEOUS WEEKLY
Qty: 2 ML | Refills: 1 | Status: SHIPPED | OUTPATIENT
Start: 2024-04-11

## 2024-04-11 RX ORDER — PHENTERMINE HYDROCHLORIDE 37.5 MG/1
37.5 TABLET ORAL
Qty: 30 TABLET | Refills: 3 | Status: SHIPPED | OUTPATIENT
Start: 2024-04-11

## 2024-04-11 NOTE — PROGRESS NOTES
[facility], [facility], [place]  [address]  [facility] [address]  Dept: [phone]       Patient:  Modesta Gordillo  :      6/3/1995  MRN:      [id number]    Chief Complaint:    Chief Complaint   Patient presents with    Follow - Up    Weight Management    Obesity       SUBJECTIVE     History of Present Illness:  Modesta is being seen today for a follow-up for medically supported weight loss.    Last OV 2023.    Initial HPI:  28 yo female.   Reports weight began in  after a break up. Also had additional weight gain at the end of  after loss of her father. Describes self as very thin in adolescence/high school.   Grew up in a single parent household with mom- diet: cereal, frozen meals.    Patient is considering medications and bariatric surgery for weight loss as a last resort.    Has recently been prescribed phentermine by pcp. Minimal weight loss.     Patient denies any history of eating disorder(s).    Patient is employed:  [employer].  Patient lives with mom.    Previous weight loss attempts: nutritionist, cleanses, weight loss programs, food delivery    Patient's goal weight: 170-180 lbs  Biggest weight loss in the past:   How weight loss was achieved: working with nutritionist   Heaviest weight ever:   Previous use of medical weight loss medications: phentermine past month     Physical activity: runs  Power test renewal coming up    Sleep: adequate hours/night    Sleep screening:     Past Medical History:   Past Medical History:    Visual impairment    eyeglasses        Comorbidities:  Hyperlipidemia-Improvement?  yes    OBJECTIVE     Vitals: /80 (BP Location: Right arm, Patient Position: Sitting, Cuff Size: adult)   Pulse 81   Ht 5' 4.4\" (1.636 m)   Wt 281 lb (127.5 kg)   LMP 2024   SpO2 98%   BMI 47.64 kg/m²     Initial weight loss: +14   Total weight loss: +07   Start weight: 274    Wt Readings from  Last 6 Encounters:   04/11/24 281 lb (127.5 kg)   12/04/23 274 lb 6.4 oz (124.5 kg)   05/17/23 267 lb (121.1 kg)   03/02/23 274 lb (124.3 kg)   03/02/23 276 lb (125.2 kg)   02/01/23 276 lb 6.4 oz (125.4 kg)       Patient Medications:    Current Outpatient Medications   Medication Sig Dispense Refill    Tirzepatide-Weight Management (ZEPBOUND) 2.5 MG/0.5ML Subcutaneous Solution Auto-injector Inject 2.5 mg into the skin once a week. 2 mL 1    Phentermine HCl 37.5 MG Oral Tab Take 1 tablet (37.5 mg total) by mouth every morning before breakfast. 30 tablet 3    albuterol 108 (90 Base) MCG/ACT Inhalation Aero Soln Inhale 2 puffs into the lungs every 6 (six) hours as needed for Wheezing or Shortness of Breath (15-30min before exercise). 18 g 0    montelukast 10 MG Oral Tab Take 1 tablet (10 mg total) by mouth nightly as needed (allergies). (Patient not taking: Reported on 12/4/2023) 90 tablet 1     Allergies:  Penicillins, Seasonal, and Latex     Social History:  Reviewed    Surgical History:    Past Surgical History:   Procedure Laterality Date    Ankle fracture surgery Right     Cholecystectomy      Other      lipoma removal from left lower back     Family History:    Family History   Problem Relation Age of Onset    Hypertension Mother     No Known Problems Father     No Known Problems Sister     No Known Problems Brother      Initial Intake:  Current snacks: eggs, nuts, trail mix- time varies  After dinner behavior: -  Night eating: -  Portion sizes: +  Binge: +  Emotional: previously   Depression: Score: 7  Grazing: -  Sweet tooth: -  Crunchy/salty: -  Etoh: Rare, social   Soda Drinker: Yes occasional                         Sports Drinks:  Yes               If yes, how much?:  celcius  Juice:  Yes                 If yes, how much?:  Occasional   Number of restaurant or fast food meals/week: 0 meals/week     Food Journal  Reviewed and Discussed:       Patient has a Food Journal?: yes   Patient is reading nutrition  labels?  yes  Average Caloric Intake:     Average CHO Intake:   Is patient exercising? yes  Type of exercise?   Runs 3-4 days/week  Strength training   Passed fitness test     Eating Habits  Patient states the following:  Eats 3 meal(s) per day  Length of time it takes to consume a meal:    # of snacks per day: Type of snacks:    Amount of soda consumption per day:  Occasional   Amount of water (in ounces) per day: Adequate   Toughest challenge:     Nutritional Goals  Eat 3-4 cups of fresh fruits or vegetables daily    Behavior Modifications Reviewed and Discussed  Eat breakfast, Eat 3 meals per day, Plan meals in advance, Read nutrition labels, Drink 64 oz of water per day, Maintain a daily food journal, Utlize portion control strategies to reduce calorie intake, Identify triggers for eating and manage cues and Eat slowly and take 20 to 30 minutes to complete each meal    Exercise Goals Reviewed and Discussed:    Aim for 150 minutes moderate level exercise weekly with 2-3 days strength training as tolerated      ROS:    Constitutional: negative  Respiratory: positive for dyspnea on exertion  Cardiovascular: negative  Gastrointestinal: negative  Integument/breast: negative  Hematologic/lymphatic: negative  Musculoskeletal:negative  Neurological: negative  Behavioral/Psych: negative  Endocrine: negative  All other systems were reviewed and are negative    Physical Exam:  General appearance: alert, appears stated age, cooperative and obese  Head: Normocephalic, without obvious abnormality, atraumatic  Neck: no adenopathy, no carotid bruit, no JVD, supple, symmetrical, trachea midline and thyroid not enlarged, symmetric, no tenderness/mass/nodules  Lungs: clear to auscultation bilaterally  Heart: S1, S2 normal, no murmur, click, rub or gallop, regular rate and rhythm  Abdomen: soft, non-tender; bowel sounds normal; no masses,  no organomegaly and abdomen obese   Extremities: intact, no edema   Pulses: 2+ and  symmetric  Skin: intact   Neurologic: Grossly normal        ASSESSMENT     Encounter Diagnosis(ses):   Encounter Diagnoses   Name Primary?    Dyslipidemia Yes    Binge eating     Morbid obesity with BMI of 45.0-49.9, adult ([facility])     Snoring     Encounter for therapeutic drug monitoring        PLAN       Diagnoses and all orders for this visit:    Dyslipidemia  -     DIETITIAN EDUCATION INITIAL, DIET (INTERNAL)    Binge eating  -     DIETITIAN EDUCATION INITIAL, DIET (INTERNAL)    Morbid obesity with BMI of 45.0-49.9, adult (HCC)  -     DIETITIAN EDUCATION INITIAL, DIET (INTERNAL)  -     Tirzepatide-Weight Management (ZEPBOUND) 2.5 MG/0.5ML Subcutaneous Solution Auto-injector; Inject 2.5 mg into the skin once a week.  -     Phentermine HCl 37.5 MG Oral Tab; Take 1 tablet (37.5 mg total) by mouth every morning before breakfast.    Snoring  -     DIETITIAN EDUCATION INITIAL, DIET (INTERNAL)    Encounter for therapeutic drug monitoring  -     DIETITIAN EDUCATION INITIAL, DIET (INTERNAL)      DYSLIPIDEMIA: Recommend dietary changes and lifestyle modifications as discussed below. Monitor.     Lab Results   Component Value Date/Time    CHOLEST 167 02/01/2023 11:23 AM    LDL 95 02/01/2023 11:23 AM    HDL 38 (L) 02/01/2023 11:23 AM    TRIG 197 (H) 02/01/2023 11:23 AM    VLDL 32 (H) 02/01/2023 11:23 AM     OBESITY/WEIGHT GAIN:     Recommended patient continue intensive lifestyle and behavioral modifications at this time for weight loss.     Reviewed lifestyle modifications: Whole Food/Plant Strong/Low Glycemic Index diet, moderate alcohol consumption, reduced sodium intake to no more than 2,400 mg/day, and at least 150 minutes of moderate physical activity per week.   Avoid processed, poor quality carbohydrates, refined grains, flour, sugar.     Goals for next month:  1. Keep a food log.   2. Drink 64 ounces of non-caloric beverages per day. No fruit juices or regular soda.  3. Aim for 150 minutes moderate exercise per week.     4. Increase fruit and vegetable servings to 5-6 per day.    5. Improve sleep and stress.      Reviewed other labs:  2/1/23- a1c, TSH, and CBC in range.   Fasting glucose, alkaline phosphatase elevated.   Will need bariatric labs if opts for surgery.      Discussed medication options for weight loss in detail with patient.      Denies personal or family hx medullary thyroid CA, endocrine neoplasia syndrome, pancreatitis hx, suicidal ideation. No renal impairment, severe GI disease, diabetes, pancreatitis risks noted.     Binge eating:     Did not start/fill Wegovy due to ongoing medication shortages.     Will check Zepbound coverage.   Start 2.5 mg weekly. Increase dose monthly as tolerated.     Has taken phentermine 37.5 mg in the AM and topiramate in the past.     For now, start 1/2 tablet phentermine 37.5 mg in the AM.      Consider Qsymia. Willing to pay OOP.   Consider wellbutrin as neeed.      SQ administration teaching provided to patient.  Discussed risks, benefits, and side effects of medication.      EKG done 2/1/23.      Recommend sleep consult.      Consider therapist as needed: binge eating.      Meet with RD.     Now has interest in bariatric surgery.   Reviewed with patient the risks and benefits of laparoscopic Sleeve Gastrectomy vs RYGBP.     Attended seminar. HMO insurance.   Considering bariatric surgery.      RTC 3-4 months.      KENISHA Tsang

## 2024-04-11 NOTE — PATIENT INSTRUCTIONS
Zepbound schedule:    2.5 mg once a week for 4 consecutive weeks, then may increase  5 mg once a week for 4 consecutive weeks, then may increase  7.5 mg once a week for 4 consecutive weeks, then may increase  10 mg once a week for 4 consecutive weeks, then may increase  12.5 mg once a week for 4 consecutive weeks, then may increase  15 mg once a week- final dose     Each month, please message me to let me know how you are doing. I can either refill the medication to a higher dose as stated above, or you will have refills at the current/same dose until you work through the side effects which are typically nausea, vomiting, GI upset, heart burn, constipation, loose stool, and/or fatigue.

## 2024-04-23 NOTE — PROGRESS NOTES
[facility]  [address]  [place] [place] [address]  Dept: [phone]  Loc: [phone]    04/23/24    Bariatric Initial Nutrition Assessment    Modesta Gordillo is a 28 year old female.    Referring Physician:  JAMES Tsang  Reason for MNT Referral: Initial assessment for: Gastric Bypass      Assessment   Medical Diagnosis:  obesity grade III    Patient Active Problem List   Diagnosis    Morbid obesity with BMI of 45.0-49.9, adult (HCC)    Dyslipidemia    Encounter for therapeutic drug monitoring    Binge eating    Snoring       Past Medical History:   Past Medical History:    Visual impairment    eyeglasses       Weight history:  Struggled with weight  most of adult life, Pt's highest adult weight 280 at 20 y/o, Pt's lowest adult weight 268 at 26 y/o, and Reason given for weight gain:  Emotional, stress eating and Other: skipping meals frequently. She has stopped stress eating currently but reports that is when her gains started.    Patient has tried: Low carbohydrate, Weight Watchers, and Other: prepared meals    Patient's most successful weight loss attempt was OTC diet pill. Lost 15-20 lbs  and kept it off 12 months.    Patient has tried these medications for weight loss: None    Patient has received these behavioral treatments for weight loss: None    Patient is being followed by JAMES Tsang for medical weight loss.    Patient has completed medical weight management No    Patient has support from: Family, Primary care physician, and Friends    Patient acknowledges binge eating behavior: Eats a larger amount of food than normal in a short period of time. and Feels disgusted, depressed, or guilty after overeating.    Patient engages in binge-purge behavior: no    Patient uses laxatives or vomits as a means of purging: no    Patient complains of: none    Patient's motivation for bariatric surgery: improving her health and lifestyle. 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    Allergies:  Allergies   Allergen Reactions    Penicillins HIVES and ITCHING    Seasonal OTHER (SEE COMMENTS)     seasonal    Latex RASH       Height:    Ht Readings from Last 1 Encounters:   04/11/24 5' 4.4\" (1.636 m)       Weight:    Wt Readings from Last 6 Encounters:   04/11/24 281 lb (127.5 kg)   12/04/23 274 lb 6.4 oz (124.5 kg)   05/17/23 267 lb (121.1 kg)   03/02/23 274 lb (124.3 kg)   03/02/23 276 lb (125.2 kg)   02/01/23 276 lb 6.4 oz (125.4 kg)       IBW:  Patient weight not recorded  BMI:  Body mass index is 46.95 kg/m².    Diet Rx: low CHO    Labs:    Lab Results   Component Value Date     (H) 02/01/2023    BUN 11 02/01/2023    BUNCREA 15.7 02/01/2023    CREATSERUM 0.70 02/01/2023    ANIONGAP 7 02/01/2023    CA 9.1 02/01/2023    OSMOCALC 290 02/01/2023    ALKPHO 139 (H) 02/01/2023    AST 23 02/01/2023    ALT 51 02/01/2023    BILT 0.3 02/01/2023    TP 8.0 02/01/2023    ALB 3.4 02/01/2023    GLOBULIN 4.6 (H) 02/01/2023     02/01/2023    K 3.4 (L) 02/01/2023     02/01/2023    CO2 27.0 02/01/2023     No results found for: \"MG\"  No results found for: \"PHOS\"    Thyroid:    Lab Results   Component Value Date    TSH 1.130 02/01/2023       Iron Panel: No results found for: \"IRON\", \"IRONTOT\", \"TIBC\", \"IRONSAT\", \"TRANSFERRIN\", \"TIBCP\", \"IRONBIND\", \"SAT\", \"SATUR\"    Diabetes:    Lab Results   Component Value Date     02/01/2023    A1C 5.1 02/01/2023       Lipid Panel:   Lab Results   Component Value Date    CHOLEST 167 02/01/2023    TRIG 197 (H) 02/01/2023    HDL 38 (L) 02/01/2023    LDL 95 02/01/2023    VLDL 32 (H) 02/01/2023    NONHDLC 129 02/01/2023       Vitamins/Minerals:  No results found for: \"B12\", \"VITB12\"  No results found for: \"VITD\", \"QVITD\", \"JFRL10AC\"  No results found for: \"THIAMINE\"   No results found for: \"VITB1\"  No results found for: \"FOLIC\"    Relevant Meds:      Current Outpatient Medications:     Tirzepatide-Weight  Management (ZEPBOUND) 2.5 MG/0.5ML Subcutaneous Solution Auto-injector, Inject 2.5 mg into the skin once a week., Disp: 2 mL, Rfl: 1    Phentermine HCl 37.5 MG Oral Tab, Take 1 tablet (37.5 mg total) by mouth every morning before breakfast., Disp: 30 tablet, Rfl: 3    albuterol 108 (90 Base) MCG/ACT Inhalation Aero Soln, Inhale 2 puffs into the lungs every 6 (six) hours as needed for Wheezing or Shortness of Breath (15-30min before exercise)., Disp: 18 g, Rfl: 0    montelukast 10 MG Oral Tab, Take 1 tablet (10 mg total) by mouth nightly as needed (allergies). (Patient not taking: Reported on 12/4/2023), Disp: 90 tablet, Rfl: 1    Vitamins/Minerals: Other: N/A  Food Intolerances: Other: N/A  Food Allergies:  N/A  Smoker:     History   Smoking Status    Never   Smokeless Tobacco    Never       Alcohol Intake:    History   Alcohol Use    Yes       Drugs:    History   Drug Use Unknown       Estimated Kcal Needs ([place] [facility] Jeor): 1915 kcal/day   Estimated Protein Needs:  130 grams/day  Estimated Fluid Needs: Aim for 64 ozs per day    Social:  Household: lives with her mom  Occupation: [employer]     Weight Loss Medications: phentermine*, Zepbound    Patient started weight loss clinic on 3/2/23 with initial weight of 274 lbs. Today is her initial visit with the [facility] RD.  Would like to reach a goal weight of 150-160 lbs. Today's Ht 5' 4.41\" (1.636 m)   Wt 277 lb (125.6 kg)   LMP 02/13/2024   BMI 46.95 kg/m² indicating a 3 lbs weight gain since stating the [facility].     Wt Readings from Last 6 Encounters:   04/11/24 281 lb (127.5 kg)   12/04/23 274 lb 6.4 oz (124.5 kg)   05/17/23 267 lb (121.1 kg)   03/02/23 274 lb (124.3 kg)   03/02/23 276 lb (125.2 kg)   02/01/23 276 lb 6.4 oz (125.4 kg)     Diet History:  Previous Attempts at Weight Loss:    Patient seen in clinic today for nutrition counseling to assist with weight loss. Patient is interested in gastric bypass.  Patient attended the bariatric seminar with  Dr. Landers. Reports certain challenges/barriers associated with weight loss. Specifically reports she frequently skips meals, but does not feel that she overeats when she does eat for the day. In the past she would stress eat and reports that is when her weight gain started. She prepares most of her meals at home. She has struggled with her weight for most of her adult life and it has been since HS since she was at her goal weight. Discussed typical diet which consists of 3 meals and 0-1 snacks/day. She is currently tracking her meals via [employer], recommended her to switch to MND. Patient was encouraged to start tracking dietary intake using an zully at initial visit. She is aiming for <100 grams CHO/day. She is currently eating meals at consistent times. She has noticed a decrease in appetite and cravings since starting Zepbound. She continues on phentermine with positive effect. She reports increased dry mouth. She had mild abdominal cramping, 3 vomiting episodes, and 1 round of diarrhea the day of her 1st injection but all resolved now. She is responsible for grocery shopping and cooking. She is eating out 2 x month with her mom. No food log provided; verbal dietary recall listed below.    Typical Diet:  Breakfast: (8:00 am)greek yogurt with banana, apple and plum  Lunch: (11:30 - 12:00 pm)3-4 oz chicken breast with broccoli, carrots, and green beans  Snack: (2:00 pm) [place] snack pack  Dinner: (4:00 - 5:00 pm) 3-4 oz chicken breast with broccoli, carrots, and green beans    Beverages: black coffee, 24 oz water with lime/day  Alcoholic Beverages: socially    Total Kcal: ~1600 kcal/day  Pro: 60 g/day  CHO: 130 g/day  Excessive in: other: Carbs  Inadequate in:  protein   Trigger Foods: none  Patient currently consumes caffeine: more often than 3 times/week  Patient currently consumes soda: none    Activity Level: active  Type: run  Duration: 60 minutes  Frequency: per day - She frequently runs 5K's with her  friends - last one she ran was last week  Strength training 1 day/week for 60 minutes    Nutrition Diagnosis: Morbid obesity: Unresolved    Intervention   1.  Nutrition Rx:  Reviewed  low CHO meal plan, Discussed portion control, Reviewed Bariatric Diet Stages 1-4, Discussed goals, and Obtain MVI  2.  Coordination of care: attend support group prior to surgery; reminder for importance of multidisciplinary consultations.  3.  Materials given: [facility] Bariatric Manual, Healthy Plate, Free Foods List,List of Carbohydrate, Protein, and Fat Sources.    Education:  Review of Food Intake (pt provided food log/journal - yes)  Importance of keeping a food log  Discussion of food modification to current diet (see GOALS)  Discussed ideas for breakfast, lunch, dinner, snacks  Basic Nutrition Education  Discussion of food groups and what constitutes a serving  Discussion of number of servings in each food group to have per meal or snack  Discussed use of high fiber vs refined carbs; use of lowfat proteins; saturated vs unsaturated fats  Importance of eating consistently and not skipping meals  Importance of protein for satiety  Importance of meal planning  Discussion of need for exercise for weight loss (see GOALS)    Goals:   Keep a food record, My Net Diary or Lose It, select the macros dashboard.  Strive to consume at least 3 meals/day, working towards 4-6 meals/snacks per day. Include protein and produce when you eat.    Aim for 100 grams carbohydrates or less/day.  Aim for 130 grams of protein per day - 30-40 grams protein/meal and 10-15 grams protein/snack.    Aim for 64 oz per day of water.  (Try protein water, adding True Lemon, Crystal Light).  Practice eating strategies, eat separately from drinking, avoid straws, chew food 20-30 times before swallowing. Make the meals last 30 minutes.  Start to limit caffeine  Continue to avoid carbonation.  Continue with current Exercise routine.     Monitor/Evaluate     Food/fluid  intake/choices  Nutrition Rx  Anthropometric measurements  Body composition-InBody Testing at next appointment per surgeon recommendation  Updated labs  F/U MD plan of care  F/U to reinforce goals  F/U on vitamin/mineral supplementation  Review quizzes   for liquid protein diet prior to surgery    Additional RD visits required to review concepts? yes  Patient understands protein requirements? yes  Patient understand fluid requirements (amount and method of intake)? yes  Patient understands post-operative diet? yes  Patient ready for Liquid Protein Education? NO    Adelina Loya RDN, LRMARY  Face-to-face time spent with pt: 3:30 pm to 5:16 pm - 106 minutes (7 units)

## 2024-04-24 ENCOUNTER — OFFICE VISIT (OUTPATIENT)
Dept: SURGERY | Facility: CLINIC | Age: 29
End: 2024-04-24
Payer: COMMERCIAL

## 2024-04-24 VITALS — WEIGHT: 277 LBS | HEIGHT: 64.41 IN | BODY MASS INDEX: 46.71 KG/M2

## 2024-04-24 DIAGNOSIS — R63.2 BINGE EATING: ICD-10-CM

## 2024-04-24 DIAGNOSIS — E66.01 MORBID OBESITY WITH BMI OF 45.0-49.9, ADULT (HCC): Primary | ICD-10-CM

## 2024-04-24 PROCEDURE — 97802 MEDICAL NUTRITION INDIV IN: CPT | Performed by: DIETITIAN, REGISTERED

## 2024-04-24 PROCEDURE — 3008F BODY MASS INDEX DOCD: CPT | Performed by: DIETITIAN, REGISTERED

## 2024-04-24 NOTE — PATIENT INSTRUCTIONS
Goals:   Keep a food record, My Net Diary or Lose It, select the macros dashboard.  Strive to consume at least 3 meals/day, working towards 4-6 meals/snacks per day. Include protein and produce when you eat.    Aim for 100 grams carbohydrates or less/day.  Aim for 130 grams of protein per day - 30-40 grams protein/meal and 10-15 grams protein/snack.    Aim for 64 oz per day of water.  (Try protein water, adding True Lemon, Crystal Light).  Practice eating strategies, eat separately from drinking, avoid straws, chew food 20-30 times before swallowing. Make the meals last 30 minutes.  Start to limit caffeine  Continue to avoid carbonation.  Continue with current Exercise routine.

## 2024-06-11 ENCOUNTER — TELEPHONE (OUTPATIENT)
Dept: SURGERY | Facility: CLINIC | Age: 29
End: 2024-06-11

## 2024-07-08 ENCOUNTER — OFFICE VISIT (OUTPATIENT)
Dept: SURGERY | Facility: CLINIC | Age: 29
End: 2024-07-08
Payer: COMMERCIAL

## 2024-07-08 VITALS
HEART RATE: 53 BPM | HEIGHT: 64.4 IN | DIASTOLIC BLOOD PRESSURE: 80 MMHG | SYSTOLIC BLOOD PRESSURE: 118 MMHG | WEIGHT: 269 LBS | BODY MASS INDEX: 45.37 KG/M2 | OXYGEN SATURATION: 96 %

## 2024-07-08 DIAGNOSIS — E78.5 DYSLIPIDEMIA: Primary | ICD-10-CM

## 2024-07-08 DIAGNOSIS — R63.2 BINGE EATING: ICD-10-CM

## 2024-07-08 DIAGNOSIS — E66.01 MORBID OBESITY WITH BMI OF 45.0-49.9, ADULT (HCC): ICD-10-CM

## 2024-07-08 DIAGNOSIS — Z51.81 ENCOUNTER FOR THERAPEUTIC DRUG MONITORING: ICD-10-CM

## 2024-07-08 PROCEDURE — 3079F DIAST BP 80-89 MM HG: CPT | Performed by: NURSE PRACTITIONER

## 2024-07-08 PROCEDURE — 3008F BODY MASS INDEX DOCD: CPT | Performed by: NURSE PRACTITIONER

## 2024-07-08 PROCEDURE — 99214 OFFICE O/P EST MOD 30 MIN: CPT | Performed by: NURSE PRACTITIONER

## 2024-07-08 PROCEDURE — 3074F SYST BP LT 130 MM HG: CPT | Performed by: NURSE PRACTITIONER

## 2024-07-08 RX ORDER — ONDANSETRON 4 MG/1
4 TABLET, ORALLY DISINTEGRATING ORAL EVERY 8 HOURS PRN
Qty: 30 TABLET | Refills: 0 | Status: SHIPPED | OUTPATIENT
Start: 2024-07-08

## 2024-07-08 RX ORDER — TIRZEPATIDE 5 MG/.5ML
5 INJECTION, SOLUTION SUBCUTANEOUS WEEKLY
Qty: 2 ML | Refills: 1 | Status: SHIPPED | OUTPATIENT
Start: 2024-07-08

## 2024-07-08 NOTE — PROGRESS NOTES
[facility], [facility], [place]  [address]  [facility] [address]  Dept: [phone]     Patient:  Modesta Gordillo  :      6/3/1995  MRN:      [id number]    Chief Complaint:    Chief Complaint   Patient presents with    Follow - Up    Weight Management    Obesity       SUBJECTIVE     History of Present Illness:  Modesta is being seen today for a follow-up for medically supported weight loss.    Last took phentermine in , did not refill medication.   Reports she was unaware of available refill.   Unable to refill Zepbound. Last Zepbound dose was mid May 2024.     Initial HPI:  28 yo female.   Reports weight began in  after a break up. Also had additional weight gain at the end of  after loss of her father. Describes self as very thin in adolescence/high school.   Grew up in a single parent household with mom- diet: cereal, frozen meals.    Patient is considering medications and bariatric surgery for weight loss as a last resort.    Has recently been prescribed phentermine by pcp. Minimal weight loss.     Patient denies any history of eating disorder(s).    Patient is employed:  [employer].  Patient lives with mom.    Previous weight loss attempts: nutritionist, cleanses, weight loss programs, food delivery    Patient's goal weight: 170-180 lbs  Biggest weight loss in the past:   How weight loss was achieved: working with nutritionist   Heaviest weight ever:   Previous use of medical weight loss medications: phentermine past month     Physical activity: runs  Power test renewal coming up    Sleep: adequate hours/night    Sleep screening:     Past Medical History:   Past Medical History:    Visual impairment    eyeglasses        Comorbidities:  Hyperlipidemia-Improvement?  yes    OBJECTIVE     Vitals: /80 (BP Location: Right arm, Patient Position: Sitting, Cuff Size: adult)   Pulse 53   Ht 5' 4.4\" (1.636 m)   Wt 269 lb (122  kg)   LMP 02/13/2024   SpO2 96%   BMI 45.60 kg/m²     Initial weight loss: -12   Total weight loss: -05     Start weight: 274    Wt Readings from Last 6 Encounters:   07/08/24 269 lb (122 kg)   04/24/24 277 lb (125.6 kg)   04/11/24 281 lb (127.5 kg)   12/04/23 274 lb 6.4 oz (124.5 kg)   05/17/23 267 lb (121.1 kg)   03/02/23 274 lb (124.3 kg)       Patient Medications:    Current Outpatient Medications   Medication Sig Dispense Refill    Tirzepatide-Weight Management (ZEPBOUND) 5 MG/0.5ML Subcutaneous Solution Auto-injector Inject 5 mg into the skin once a week. 2 mL 1    ondansetron 4 MG Oral Tablet Dispersible Take 1 tablet (4 mg total) by mouth every 8 (eight) hours as needed. 30 tablet 0    Tirzepatide-Weight Management (ZEPBOUND) 2.5 MG/0.5ML Subcutaneous Solution Auto-injector Inject 2.5 mg into the skin once a week. 2 mL 1    Phentermine HCl 37.5 MG Oral Tab Take 1 tablet (37.5 mg total) by mouth every morning before breakfast. 30 tablet 3    albuterol 108 (90 Base) MCG/ACT Inhalation Aero Soln Inhale 2 puffs into the lungs every 6 (six) hours as needed for Wheezing or Shortness of Breath (15-30min before exercise). 18 g 0    montelukast 10 MG Oral Tab Take 1 tablet (10 mg total) by mouth nightly as needed (allergies). (Patient not taking: Reported on 12/4/2023) 90 tablet 1     Allergies:  Penicillins, Seasonal, and Latex     Social History:  Reviewed    Surgical History:    Past Surgical History:   Procedure Laterality Date    Ankle fracture surgery Right     Cholecystectomy      Other      lipoma removal from left lower back     Family History:    Family History   Problem Relation Age of Onset    Hypertension Mother     No Known Problems Father     No Known Problems Sister     No Known Problems Brother      Initial Intake:  Current snacks: eggs, nuts, trail mix- time varies  After dinner behavior: -  Night eating: -  Portion sizes: +  Binge: +  Emotional: previously   Depression: Score: 7  Grazing:  -  Sweet tooth: -  Crunchy/salty: -  Etoh: Rare, social   Soda Drinker: Yes occasional                         Sports Drinks:  Yes               If yes, how much?:  celcius  Juice:  Yes                 If yes, how much?:  Occasional   Number of restaurant or fast food meals/week: 0 meals/week     Food Journal  Reviewed and Discussed:       Patient has a Food Journal?: yes   Patient is reading nutrition labels?  yes  Average Caloric Intake:     Average CHO Intake:   Is patient exercising? yes  Type of exercise?   Cardio, Runs 3-4 days/week  5Ks   Minimal strength training     Eating Habits  Patient states the following:  Eats 3 meal(s) per day  Length of time it takes to consume a meal:    # of snacks per day: None  Type of snacks:    Amount of soda consumption per day:  Occasional   Amount of water (in ounces) per day: Adequate   Toughest challenge: what to eat     IF  Tracking protein    Nutritional Goals  Eat 3-4 cups of fresh fruits or vegetables daily    Behavior Modifications Reviewed and Discussed  Eat breakfast, Eat 3 meals per day, Plan meals in advance, Read nutrition labels, Drink 64 oz of water per day, Maintain a daily food journal, Utlize portion control strategies to reduce calorie intake, Identify triggers for eating and manage cues and Eat slowly and take 20 to 30 minutes to complete each meal    Exercise Goals Reviewed and Discussed:    Aim for 150 minutes moderate level exercise weekly with 2-3 days strength training as tolerated      ROS:    Constitutional: negative  Respiratory: positive for dyspnea on exertion  Cardiovascular: negative  Gastrointestinal: negative  Integument/breast: negative  Hematologic/lymphatic: negative  Musculoskeletal:negative  Neurological: negative  Behavioral/Psych: negative  Endocrine: negative  All other systems were reviewed and are negative    Physical Exam:  General appearance: alert, appears stated age, cooperative and obese  Head: Normocephalic, without obvious  abnormality, atraumatic  Neck: no adenopathy, no carotid bruit, no JVD, supple, symmetrical, trachea midline and thyroid not enlarged, symmetric, no tenderness/mass/nodules  Lungs: clear to auscultation bilaterally  Heart: S1, S2 normal, no murmur, click, rub or gallop, regular rate and rhythm  Abdomen: soft, non-tender; bowel sounds normal; no masses,  no organomegaly and abdomen obese   Extremities: intact, no edema   Pulses: 2+ and symmetric  Skin: intact   Neurologic: Grossly normal    ASSESSMENT     Encounter Diagnosis(ses):   Encounter Diagnoses   Name Primary?    Dyslipidemia Yes    Morbid obesity with BMI of 45.0-49.9, adult ([facility]) [E66.01, Z68.42]     Encounter for therapeutic drug monitoring     Binge eating [R63.2]      PLAN     Diagnoses and all orders for this visit:    Dyslipidemia  -     Pulmonary Referral - In Network    Morbid obesity with BMI of 45.0-49.9, adult ([facility]) [E66.01, Z68.42]  -     Tirzepatide-Weight Management (ZEPBOUND) 5 MG/0.5ML Subcutaneous Solution Auto-injector; Inject 5 mg into the skin once a week.  -     Pulmonary Referral - In Network    Encounter for therapeutic drug monitoring  -     Pulmonary Referral - In Network    Binge eating [R63.2]  -     Pulmonary Referral - In Network    Other orders  -     ondansetron 4 MG Oral Tablet Dispersible; Take 1 tablet (4 mg total) by mouth every 8 (eight) hours as needed.      DYSLIPIDEMIA: Recommend dietary changes and lifestyle modifications as discussed below. Monitor.     Lab Results   Component Value Date/Time    CHOLEST 167 02/01/2023 11:23 AM    LDL 95 02/01/2023 11:23 AM    HDL 38 (L) 02/01/2023 11:23 AM    TRIG 197 (H) 02/01/2023 11:23 AM    VLDL 32 (H) 02/01/2023 11:23 AM     OBESITY/WEIGHT GAIN:     Recommended patient continue intensive lifestyle and behavioral modifications at this time for weight loss.     Reviewed lifestyle modifications: Whole Food/Plant Strong/Low Glycemic Index diet, moderate alcohol consumption, reduced  sodium intake to no more than 2,400 mg/day, and at least 150 minutes of moderate physical activity per week.   Avoid processed, poor quality carbohydrates, refined grains, flour, sugar.     Goals for next month:  1. Keep a food log.   2. Drink 64 ounces of non-caloric beverages per day. No fruit juices or regular soda.  3. Aim for 150 minutes moderate exercise per week.    4. Increase fruit and vegetable servings to 5-6 per day.    5. Improve sleep and stress.      Reviewed other labs:  2/1/23- a1c, TSH, and CBC in range.   Fasting glucose, alkaline phosphatase elevated.   Will need bariatric labs if opts for surgery.      Discussed medication options for weight loss in detail with patient.      Denies personal or family hx medullary thyroid CA, endocrine neoplasia syndrome, pancreatitis hx, suicidal ideation. No renal impairment, severe GI disease, diabetes, pancreatitis risks noted.     Binge eating: Improved.     Did not start/fill Wegovy due to ongoing medication shortages.     Has Zepbound coverage.   Continue Zepbound.   Increase to 5 mg weekly. Increase dose monthly as tolerated.   Monitor for nausea closely. Zofran prn.     Has taken phentermine 37.5 mg in the AM and topiramate in the past.    Hold phentermine 37.5 mg in the AM.   Use medication during Zepbound shortages.      Consider Qsymia. Willing to pay OOP.   Consider wellbutrin as neeed.      SQ administration teaching provided to patient.  Discussed risks, benefits, and side effects of medication.      EKG done 2/1/23.      Recommend sleep consult.      Consider therapist as needed: binge eating.      Following with RD.    grams of protein.     Previously interested in bariatric surgery.   Reviewed with patient the risks and benefits of laparoscopic Sleeve Gastrectomy vs RYGBP.     Attended seminar. HMO insurance.   Not considering bariatric surgery at this time.      RTC 4 months.      KENISHA Tsang

## 2024-07-08 NOTE — PROGRESS NOTES
[facility], [facility], [place]  [address]  [facility] [address]  Dept: [phone]     Patient:  Modesta Gordillo  :      6/3/1995  MRN:      [id number]    Chief Complaint:    Chief Complaint   Patient presents with    Follow - Up    Weight Management    Obesity       SUBJECTIVE     History of Present Illness:  Modesta is being seen today for a follow-up for medically supported weight loss.    Last took phentermine in , did not refill medication.   Reports she was unaware of available refill.   Unable to refill Zepbound. Last Zepbound dose was mid May 2024.     Initial HPI:  26 yo female.   Reports weight began in  after a break up. Also had additional weight gain at the end of  after loss of her father. Describes self as very thin in adolescence/high school.   Grew up in a single parent household with mom- diet: cereal, frozen meals.    Patient is considering medications and bariatric surgery for weight loss as a last resort.    Has recently been prescribed phentermine by pcp. Minimal weight loss.     Patient denies any history of eating disorder(s).    Patient is employed:  [employer].  Patient lives with mom.    Previous weight loss attempts: nutritionist, cleanses, weight loss programs, food delivery    Patient's goal weight: 170-180 lbs  Biggest weight loss in the past:   How weight loss was achieved: working with nutritionist   Heaviest weight ever:   Previous use of medical weight loss medications: phentermine past month     Physical activity: runs  Power test renewal coming up    Sleep: adequate hours/night    Sleep screening:     Past Medical History:   Past Medical History:    Visual impairment    eyeglasses        Comorbidities:  Hyperlipidemia-Improvement?  yes    OBJECTIVE     Vitals: /80 (BP Location: Right arm, Patient Position: Sitting, Cuff Size: adult)   Pulse 53   Ht 5' 4.4\" (1.636 m)   Wt 269 lb (122  kg)   LMP 02/13/2024   SpO2 96%   BMI 45.60 kg/m²     Initial weight loss: -12   Total weight loss: -05     Start weight: 274    Wt Readings from Last 6 Encounters:   07/08/24 269 lb (122 kg)   04/24/24 277 lb (125.6 kg)   04/11/24 281 lb (127.5 kg)   12/04/23 274 lb 6.4 oz (124.5 kg)   05/17/23 267 lb (121.1 kg)   03/02/23 274 lb (124.3 kg)       Patient Medications:    Current Outpatient Medications   Medication Sig Dispense Refill    Tirzepatide-Weight Management (ZEPBOUND) 5 MG/0.5ML Subcutaneous Solution Auto-injector Inject 5 mg into the skin once a week. 2 mL 1    ondansetron 4 MG Oral Tablet Dispersible Take 1 tablet (4 mg total) by mouth every 8 (eight) hours as needed. 30 tablet 0    Tirzepatide-Weight Management (ZEPBOUND) 2.5 MG/0.5ML Subcutaneous Solution Auto-injector Inject 2.5 mg into the skin once a week. 2 mL 1    Phentermine HCl 37.5 MG Oral Tab Take 1 tablet (37.5 mg total) by mouth every morning before breakfast. 30 tablet 3    albuterol 108 (90 Base) MCG/ACT Inhalation Aero Soln Inhale 2 puffs into the lungs every 6 (six) hours as needed for Wheezing or Shortness of Breath (15-30min before exercise). 18 g 0    montelukast 10 MG Oral Tab Take 1 tablet (10 mg total) by mouth nightly as needed (allergies). (Patient not taking: Reported on 12/4/2023) 90 tablet 1     Allergies:  Penicillins, Seasonal, and Latex     Social History:  Reviewed    Surgical History:    Past Surgical History:   Procedure Laterality Date    Ankle fracture surgery Right     Cholecystectomy      Other      lipoma removal from left lower back     Family History:    Family History   Problem Relation Age of Onset    Hypertension Mother     No Known Problems Father     No Known Problems Sister     No Known Problems Brother      Initial Intake:  Current snacks: eggs, nuts, trail mix- time varies  After dinner behavior: -  Night eating: -  Portion sizes: +  Binge: +  Emotional: previously   Depression: Score: 7  Grazing:  -  Sweet tooth: -  Crunchy/salty: -  Etoh: Rare, social   Soda Drinker: Yes occasional                         Sports Drinks:  Yes               If yes, how much?:  celcius  Juice:  Yes                 If yes, how much?:  Occasional   Number of restaurant or fast food meals/week: 0 meals/week     Food Journal  Reviewed and Discussed:       Patient has a Food Journal?: yes   Patient is reading nutrition labels?  yes  Average Caloric Intake:     Average CHO Intake:   Is patient exercising? yes  Type of exercise?   Cardio, Runs 3-4 days/week  5Ks   Minimal strength training     Eating Habits  Patient states the following:  Eats 3 meal(s) per day  Length of time it takes to consume a meal:    # of snacks per day: None  Type of snacks:    Amount of soda consumption per day:  Occasional   Amount of water (in ounces) per day: Adequate   Toughest challenge: what to eat     IF  Tracking protein    Nutritional Goals  Eat 3-4 cups of fresh fruits or vegetables daily    Behavior Modifications Reviewed and Discussed  Eat breakfast, Eat 3 meals per day, Plan meals in advance, Read nutrition labels, Drink 64 oz of water per day, Maintain a daily food journal, Utlize portion control strategies to reduce calorie intake, Identify triggers for eating and manage cues and Eat slowly and take 20 to 30 minutes to complete each meal    Exercise Goals Reviewed and Discussed:    Aim for 150 minutes moderate level exercise weekly with 2-3 days strength training as tolerated      ROS:    Constitutional: negative  Respiratory: positive for dyspnea on exertion  Cardiovascular: negative  Gastrointestinal: negative  Integument/breast: negative  Hematologic/lymphatic: negative  Musculoskeletal:negative  Neurological: negative  Behavioral/Psych: negative  Endocrine: negative  All other systems were reviewed and are negative    Physical Exam:  General appearance: alert, appears stated age, cooperative and obese  Head: Normocephalic, without obvious  abnormality, atraumatic  Neck: no adenopathy, no carotid bruit, no JVD, supple, symmetrical, trachea midline and thyroid not enlarged, symmetric, no tenderness/mass/nodules  Lungs: clear to auscultation bilaterally  Heart: S1, S2 normal, no murmur, click, rub or gallop, regular rate and rhythm  Abdomen: soft, non-tender; bowel sounds normal; no masses,  no organomegaly and abdomen obese   Extremities: intact, no edema   Pulses: 2+ and symmetric  Skin: intact   Neurologic: Grossly normal    ASSESSMENT     Encounter Diagnosis(ses):   Encounter Diagnoses   Name Primary?    Morbid obesity with BMI of 45.0-49.9, adult ([facility]) [E66.01, Z68.42] Yes    Dyslipidemia     Encounter for therapeutic drug monitoring     Binge eating [R63.2]      PLAN     Diagnoses and all orders for this visit:    Morbid obesity with BMI of 45.0-49.9, adult ([facility]) [E66.01, Z68.42]  -     Tirzepatide-Weight Management (ZEPBOUND) 5 MG/0.5ML Subcutaneous Solution Auto-injector; Inject 5 mg into the skin once a week.    Dyslipidemia    Encounter for therapeutic drug monitoring    Binge eating [R63.2]    Other orders  -     ondansetron 4 MG Oral Tablet Dispersible; Take 1 tablet (4 mg total) by mouth every 8 (eight) hours as needed.      DYSLIPIDEMIA: Recommend dietary changes and lifestyle modifications as discussed below. Monitor.     Lab Results   Component Value Date/Time    CHOLEST 167 02/01/2023 11:23 AM    LDL 95 02/01/2023 11:23 AM    HDL 38 (L) 02/01/2023 11:23 AM    TRIG 197 (H) 02/01/2023 11:23 AM    VLDL 32 (H) 02/01/2023 11:23 AM     OBESITY/WEIGHT GAIN:     Recommended patient continue intensive lifestyle and behavioral modifications at this time for weight loss.     Reviewed lifestyle modifications: Whole Food/Plant Strong/Low Glycemic Index diet, moderate alcohol consumption, reduced sodium intake to no more than 2,400 mg/day, and at least 150 minutes of moderate physical activity per week.   Avoid processed, poor quality carbohydrates,  refined grains, flour, sugar.     Goals for next month:  1. Keep a food log.   2. Drink 64 ounces of non-caloric beverages per day. No fruit juices or regular soda.  3. Aim for 150 minutes moderate exercise per week.    4. Increase fruit and vegetable servings to 5-6 per day.    5. Improve sleep and stress.      Reviewed other labs:  2/1/23- a1c, TSH, and CBC in range.   Fasting glucose, alkaline phosphatase elevated.   Will need bariatric labs if opts for surgery.      Discussed medication options for weight loss in detail with patient.      Denies personal or family hx medullary thyroid CA, endocrine neoplasia syndrome, pancreatitis hx, suicidal ideation. No renal impairment, severe GI disease, diabetes, pancreatitis risks noted.     Binge eating: Improved.     Did not start/fill Wegovy due to ongoing medication shortages.     Has Zepbound coverage.   Continue Zepbound.   Increase to 5 mg weekly. Increase dose monthly as tolerated.   Monitor for nausea closely.     Has taken phentermine 37.5 mg in the AM and topiramate in the past.    Hold phentermine 37.5 mg in the AM.   Use medication during Zepbound shortages.      Consider Qsymia. Willing to pay OOP.   Consider wellbutrin as neeed.      SQ administration teaching provided to patient.  Discussed risks, benefits, and side effects of medication.      EKG done 2/1/23.      Recommend sleep consult.      Consider therapist as needed: binge eating.      Following with RD.    grams of protein.     Previously interested in bariatric surgery.   Reviewed with patient the risks and benefits of laparoscopic Sleeve Gastrectomy vs RYGBP.     Attended seminar. HMO insurance.   Not considering bariatric surgery at this time.      RTC 4 months.      KENISHA Casey Student

## 2024-07-30 ENCOUNTER — OFFICE VISIT (OUTPATIENT)
Dept: INTERNAL MEDICINE CLINIC | Facility: CLINIC | Age: 29
End: 2024-07-30
Payer: COMMERCIAL

## 2024-07-30 ENCOUNTER — PATIENT MESSAGE (OUTPATIENT)
Dept: INTERNAL MEDICINE CLINIC | Facility: CLINIC | Age: 29
End: 2024-07-30

## 2024-07-30 VITALS
OXYGEN SATURATION: 96 % | DIASTOLIC BLOOD PRESSURE: 80 MMHG | HEART RATE: 90 BPM | BODY MASS INDEX: 43.85 KG/M2 | TEMPERATURE: 98 F | SYSTOLIC BLOOD PRESSURE: 112 MMHG | WEIGHT: 260 LBS | HEIGHT: 64.4 IN

## 2024-07-30 DIAGNOSIS — Z30.09 BIRTH CONTROL COUNSELING: ICD-10-CM

## 2024-07-30 DIAGNOSIS — Z11.3 SCREEN FOR STD (SEXUALLY TRANSMITTED DISEASE): ICD-10-CM

## 2024-07-30 DIAGNOSIS — Z00.00 WELLNESS EXAMINATION: Primary | ICD-10-CM

## 2024-07-30 DIAGNOSIS — Z71.3 WEIGHT LOSS COUNSELING, ENCOUNTER FOR: ICD-10-CM

## 2024-07-30 RX ORDER — ETONOGESTREL AND ETHINYL ESTRADIOL VAGINAL RING .015; .12 MG/D; MG/D
RING VAGINAL
Qty: 9 RING | Refills: 3 | Status: SHIPPED | OUTPATIENT
Start: 2024-07-30 | End: 2024-08-02

## 2024-07-30 NOTE — PROGRESS NOTES
CC: Annual Physical Exam    HPI:   Modesta Gordillo is a 29 year old female who presents for a complete physical exam.    HCM  -Diet:  Well-balanced.   -Exercise: regularly  -Mental Health: denies any depression or anxiety sx  -Skin care:  no concerning lesions/moles  -Menses: regular cycles. No heavy bleeding or cramping. Interested in OCPs    Working with bariatrics    Wt Readings from Last 6 Encounters:   07/30/24 260 lb (117.9 kg)   07/08/24 269 lb (122 kg)   04/24/24 277 lb (125.6 kg)   04/11/24 281 lb (127.5 kg)   12/04/23 274 lb 6.4 oz (124.5 kg)   05/17/23 267 lb (121.1 kg)     Body mass index is 44.08 kg/m².     Results for orders placed or performed during the hospital encounter of 03/11/24   POCT Rapid Strep   Result Value Ref Range    POCT Rapid Strep Negative Negative   Rapid SARS-CoV-2 by PCR    Specimen: Nares; Other   Result Value Ref Range    Rapid SARS-CoV-2 by PCR Not Detected Not Detected       Current Outpatient Medications   Medication Sig Dispense Refill    Tirzepatide-Weight Management (ZEPBOUND) 5 MG/0.5ML Subcutaneous Solution Auto-injector Inject 5 mg into the skin once a week. 2 mL 1    Phentermine HCl 37.5 MG Oral Tab Take 1 tablet (37.5 mg total) by mouth every morning before breakfast. 30 tablet 3    Norelgestromin-Eth Estradiol 150-35 MCG/24HR Transdermal Patch Weekly Apply 1 patch each week for 3 weeks (21 total days); followed by 1 week that is patch-free. Each patch should be applied on the same day each week (\"patch change day\") and only 1 patch should be worn at a time. No more than 7 days should pass during the patch-free interval. 9 each 3    albuterol 108 (90 Base) MCG/ACT Inhalation Aero Soln Inhale 2 puffs into the lungs every 6 (six) hours as needed for Wheezing or Shortness of Breath (15-30min before exercise). (Patient not taking: Reported on 7/30/2024) 18 g 0      Allergies   Allergen Reactions    Penicillins HIVES and ITCHING    Seasonal OTHER (SEE COMMENTS)     seasonal     Latex RASH      Past Medical History:    Visual impairment    eyeglasses      Past Surgical History:   Procedure Laterality Date    Ankle fracture surgery Right     Cholecystectomy      Other      lipoma removal from left lower back      Family History   Problem Relation Age of Onset    Hypertension Mother     No Known Problems Father     No Known Problems Sister     No Known Problems Brother       Social History:   Social History     Socioeconomic History    Marital status: Single   Tobacco Use    Smoking status: Never    Smokeless tobacco: Never   Substance and Sexual Activity    Alcohol use: Yes    Drug use: Never          REVIEW OF SYSTEMS:   Review of Systems   Constitutional:  Negative for chills, fatigue and fever.   Respiratory:  Negative for cough and shortness of breath.    Cardiovascular:  Negative for chest pain, palpitations and leg swelling.   Gastrointestinal:  Negative for abdominal pain, constipation, diarrhea and vomiting.   Neurological:  Negative for headaches.            PHYSICAL EXAM:   /80   Pulse 90   Temp 98.2 °F (36.8 °C)   Ht 5' 4.4\" (1.636 m)   Wt 260 lb (117.9 kg)   LMP 07/29/2024 (Exact Date)   SpO2 96%   BMI 44.08 kg/m²  Estimated body mass index is 44.08 kg/m² as calculated from the following:    Height as of this encounter: 5' 4.4\" (1.636 m).    Weight as of this encounter: 260 lb (117.9 kg).     Wt Readings from Last 3 Encounters:   07/30/24 260 lb (117.9 kg)   07/08/24 269 lb (122 kg)   04/24/24 277 lb (125.6 kg)       Physical Exam  Vitals reviewed.   Constitutional:       General: She is not in acute distress.     Appearance: She is well-developed.   HENT:      Head: Normocephalic.      Right Ear: Tympanic membrane and external ear normal.      Left Ear: Tympanic membrane and external ear normal.   Eyes:      Conjunctiva/sclera: Conjunctivae normal.      Pupils: Pupils are equal, round, and reactive to light.   Neck:      Thyroid: No thyromegaly.   Cardiovascular:       Rate and Rhythm: Normal rate and regular rhythm.      Heart sounds: Normal heart sounds. No murmur heard.  Pulmonary:      Effort: Pulmonary effort is normal. No respiratory distress.      Breath sounds: Normal breath sounds.   Abdominal:      General: Bowel sounds are normal. There is no distension.      Palpations: Abdomen is soft.      Tenderness: There is no abdominal tenderness.   Musculoskeletal:         General: Normal range of motion.      Cervical back: Normal range of motion and neck supple.      Right lower leg: No edema.      Left lower leg: No edema.   Skin:     Findings: No rash.   Neurological:      General: No focal deficit present.      Cranial Nerves: No cranial nerve deficit.           ASSESSMENT AND PLAN:   Modesta Gordillo is a 29 year old female who presents for a complete physical exam.      Health maintenance, will check :   Orders Placed This Encounter   Procedures    CBC With Differential With Platelet    Comp Metabolic Panel (14)    Hemoglobin A1C    Lipid Panel    TSH W Reflex To Free T4    HIV Ag/Ab Combo    T Pallidum Screening Cascade    Chlamydia/Gc Amplification       Diagnoses and all orders for this visit:    Wellness examination  -     CBC With Differential With Platelet  -     Comp Metabolic Panel (14); Future  -     Hemoglobin A1C; Future  -     Lipid Panel; Future  -     TSH W Reflex To Free T4; Future  -     Pt reassured and all questions answered.  -     Age/sex specific preventive measures/immunizations reviewed and discussed with pt.    Screen for STD (sexually transmitted disease)  -     Chlamydia/Gc Amplification; Future  -     HIV Ag/Ab Combo; Future  -     T Pallidum Screening Cascade; Future    Weight loss counseling, encounter for  -     Pt counseled with regards to diet and exercise.  -continue bariatrics f/u    Birth control counseling  -Etonogestrel-Ethinyl Estradiol (NUVARING) 0.12-0.015 MG/24HR Vaginal Ring; One ring, inserted vaginally and left in place  continuously for 3 consecutive weeks, then removed for 1 week. A new ring is inserted 7 days after the last was removed (even if bleeding is not complete) and should be inserted at approximately the same time of day the ring was removed the previous week.     -Birth control options reviewed extensively and discussed with pt,  Side effects discussed including menstrual irregularity.Back-up method discussed x1wk. Discussed same day vs menses start, proper usage, missed pill regimen. Encouraged protection against STD  -Risks of DVT, migraine, HTN discussed  -Plans to start with next cycle. Advised home pregnancy test 1st      Health Maintenance Due   Topic Date Due    COVID-19 Vaccine (4 - 2023-24 season) 09/01/2023    Annual Physical  03/02/2024         The patient indicates understanding of these issues and agrees to the plan.  No follow-ups on file.  Reasurrance and education provided. All questions answered. Red flags/ ER precautions discussed.      Spent 35minutes (10 in preventative and  25 in acute/chronic)  including chart review, reviewing appropriate medical history, evaluating patient, discussing treatment options, counseling and education (diet and exercise), ordering appropriate diagnostic tests and completing documentation.

## 2024-07-31 NOTE — TELEPHONE ENCOUNTER
Pt called stating that the wrong prescription was sent, that she wants the patch not the ring  To please cancel the ring and send new rx for patch    Please inform pt when sent to pharmacy

## 2024-08-02 ENCOUNTER — TELEPHONE (OUTPATIENT)
Dept: INTERNAL MEDICINE CLINIC | Facility: CLINIC | Age: 29
End: 2024-08-02

## 2024-08-02 RX ORDER — NORELGESTROMIN AND ETHINYL ESTRADIOL 35; 150 UG/MG; UG/MG
PATCH TRANSDERMAL
Qty: 9 EACH | Refills: 3 | Status: SHIPPED | OUTPATIENT
Start: 2024-08-02

## 2024-08-05 NOTE — TELEPHONE ENCOUNTER
August 2, 2024  Ivis Escoto RN         8/2/24 11:17 AM  Note     Spoke with patient. She got her birth control patches from [facility]

## 2024-08-12 ENCOUNTER — LAB ENCOUNTER (OUTPATIENT)
Dept: LAB | Facility: HOSPITAL | Age: 29
End: 2024-08-12
Attending: FAMILY MEDICINE
Payer: COMMERCIAL

## 2024-08-12 DIAGNOSIS — Z00.00 WELLNESS EXAMINATION: ICD-10-CM

## 2024-08-12 DIAGNOSIS — Z11.3 SCREEN FOR STD (SEXUALLY TRANSMITTED DISEASE): ICD-10-CM

## 2024-08-12 LAB
ALBUMIN SERPL-MCNC: 4.7 G/DL (ref 3.2–4.8)
ALBUMIN/GLOB SERPL: 1.3 {RATIO} (ref 1–2)
ALP LIVER SERPL-CCNC: 143 U/L
ALT SERPL-CCNC: 28 U/L
ANION GAP SERPL CALC-SCNC: 10 MMOL/L (ref 0–18)
AST SERPL-CCNC: 14 U/L (ref ?–34)
BASOPHILS # BLD AUTO: 0.04 X10(3) UL (ref 0–0.2)
BASOPHILS NFR BLD AUTO: 0.4 %
BILIRUB SERPL-MCNC: 0.4 MG/DL (ref 0.3–1.2)
BUN BLD-MCNC: 14 MG/DL (ref 9–23)
BUN/CREAT SERPL: 17.9 (ref 10–20)
C TRACH DNA SPEC QL NAA+PROBE: NEGATIVE
CALCIUM BLD-MCNC: 9.6 MG/DL (ref 8.7–10.4)
CHLORIDE SERPL-SCNC: 107 MMOL/L (ref 98–112)
CHOLEST SERPL-MCNC: 157 MG/DL (ref ?–200)
CO2 SERPL-SCNC: 24 MMOL/L (ref 21–32)
CREAT BLD-MCNC: 0.78 MG/DL
DEPRECATED RDW RBC AUTO: 41.7 FL (ref 35.1–46.3)
EGFRCR SERPLBLD CKD-EPI 2021: 105 ML/MIN/1.73M2 (ref 60–?)
EOSINOPHIL # BLD AUTO: 0.18 X10(3) UL (ref 0–0.7)
EOSINOPHIL NFR BLD AUTO: 1.6 %
ERYTHROCYTE [DISTWIDTH] IN BLOOD BY AUTOMATED COUNT: 12.8 % (ref 11–15)
EST. AVERAGE GLUCOSE BLD GHB EST-MCNC: 100 MG/DL (ref 68–126)
FASTING PATIENT LIPID ANSWER: YES
FASTING STATUS PATIENT QL REPORTED: YES
GLOBULIN PLAS-MCNC: 3.6 G/DL (ref 2–3.5)
GLUCOSE BLD-MCNC: 83 MG/DL (ref 70–99)
HBA1C MFR BLD: 5.1 % (ref ?–5.7)
HCT VFR BLD AUTO: 42.9 %
HDLC SERPL-MCNC: 38 MG/DL (ref 40–59)
HGB BLD-MCNC: 14.4 G/DL
IMM GRANULOCYTES # BLD AUTO: 0.05 X10(3) UL (ref 0–1)
IMM GRANULOCYTES NFR BLD: 0.4 %
LDLC SERPL CALC-MCNC: 106 MG/DL (ref ?–100)
LYMPHOCYTES # BLD AUTO: 2.27 X10(3) UL (ref 1–4)
LYMPHOCYTES NFR BLD AUTO: 20.1 %
MCH RBC QN AUTO: 29.7 PG (ref 26–34)
MCHC RBC AUTO-ENTMCNC: 33.6 G/DL (ref 31–37)
MCV RBC AUTO: 88.5 FL
MONOCYTES # BLD AUTO: 0.65 X10(3) UL (ref 0.1–1)
MONOCYTES NFR BLD AUTO: 5.8 %
N GONORRHOEA DNA SPEC QL NAA+PROBE: NEGATIVE
NEUTROPHILS # BLD AUTO: 8.11 X10 (3) UL (ref 1.5–7.7)
NEUTROPHILS # BLD AUTO: 8.11 X10(3) UL (ref 1.5–7.7)
NEUTROPHILS NFR BLD AUTO: 71.7 %
NONHDLC SERPL-MCNC: 119 MG/DL (ref ?–130)
OSMOLALITY SERPL CALC.SUM OF ELEC: 292 MOSM/KG (ref 275–295)
PLATELET # BLD AUTO: 319 10(3)UL (ref 150–450)
POTASSIUM SERPL-SCNC: 3.6 MMOL/L (ref 3.5–5.1)
PROT SERPL-MCNC: 8.3 G/DL (ref 5.7–8.2)
RBC # BLD AUTO: 4.85 X10(6)UL
SODIUM SERPL-SCNC: 141 MMOL/L (ref 136–145)
T PALLIDUM AB SER QL IA: NONREACTIVE
TRIGL SERPL-MCNC: 67 MG/DL (ref 30–149)
TSI SER-ACNC: 2.1 MIU/ML (ref 0.55–4.78)
VLDLC SERPL CALC-MCNC: 11 MG/DL (ref 0–30)
WBC # BLD AUTO: 11.3 X10(3) UL (ref 4–11)

## 2024-08-12 PROCEDURE — 80061 LIPID PANEL: CPT

## 2024-08-12 PROCEDURE — 85025 COMPLETE CBC W/AUTO DIFF WBC: CPT | Performed by: FAMILY MEDICINE

## 2024-08-12 PROCEDURE — 83036 HEMOGLOBIN GLYCOSYLATED A1C: CPT

## 2024-08-12 PROCEDURE — 87389 HIV-1 AG W/HIV-1&-2 AB AG IA: CPT

## 2024-08-12 PROCEDURE — 80053 COMPREHEN METABOLIC PANEL: CPT

## 2024-08-12 PROCEDURE — 86780 TREPONEMA PALLIDUM: CPT

## 2024-08-12 PROCEDURE — 87491 CHLMYD TRACH DNA AMP PROBE: CPT

## 2024-08-12 PROCEDURE — 84443 ASSAY THYROID STIM HORMONE: CPT

## 2024-08-12 PROCEDURE — 36415 COLL VENOUS BLD VENIPUNCTURE: CPT | Performed by: FAMILY MEDICINE

## 2024-08-12 PROCEDURE — 87591 N.GONORRHOEAE DNA AMP PROB: CPT

## 2024-08-19 DIAGNOSIS — E66.01 MORBID OBESITY WITH BMI OF 45.0-49.9, ADULT (HCC): ICD-10-CM

## 2024-08-21 ENCOUNTER — TELEPHONE (OUTPATIENT)
Dept: SURGERY | Facility: CLINIC | Age: 29
End: 2024-08-21

## 2024-08-21 DIAGNOSIS — E66.01 MORBID OBESITY WITH BMI OF 45.0-49.9, ADULT (HCC): ICD-10-CM

## 2024-08-21 RX ORDER — TIRZEPATIDE 5 MG/.5ML
5 INJECTION, SOLUTION SUBCUTANEOUS WEEKLY
Qty: 2 ML | Refills: 0 | Status: SHIPPED | OUTPATIENT
Start: 2024-08-21 | End: 2024-08-22

## 2024-08-21 RX ORDER — TIRZEPATIDE 5 MG/.5ML
5 INJECTION, SOLUTION SUBCUTANEOUS WEEKLY
Qty: 2 ML | Refills: 0 | Status: SHIPPED | OUTPATIENT
Start: 2024-08-21 | End: 2024-08-21

## 2024-08-21 NOTE — TELEPHONE ENCOUNTER
Returning a call patient left on 8/19/24 @ 12:44pm. Left voicemail to call back the office so we can help. YOAV

## 2024-08-22 ENCOUNTER — TELEPHONE (OUTPATIENT)
Dept: SURGERY | Facility: CLINIC | Age: 29
End: 2024-08-22

## 2024-08-22 DIAGNOSIS — E66.01 MORBID OBESITY WITH BMI OF 45.0-49.9, ADULT (HCC): ICD-10-CM

## 2024-08-22 RX ORDER — TIRZEPATIDE 5 MG/.5ML
5 INJECTION, SOLUTION SUBCUTANEOUS WEEKLY
Qty: 2 ML | Refills: 0 | Status: SHIPPED | OUTPATIENT
Start: 2024-08-22

## 2024-09-26 ENCOUNTER — TELEPHONE (OUTPATIENT)
Dept: SURGERY | Facility: CLINIC | Age: 29
End: 2024-09-26

## 2024-09-26 DIAGNOSIS — E66.01 MORBID OBESITY WITH BMI OF 45.0-49.9, ADULT (HCC): Primary | ICD-10-CM

## 2024-09-26 RX ORDER — TIRZEPATIDE 7.5 MG/.5ML
7.5 INJECTION, SOLUTION SUBCUTANEOUS WEEKLY
Qty: 2 ML | Refills: 1 | Status: SHIPPED | OUTPATIENT
Start: 2024-09-26

## 2024-10-18 ENCOUNTER — TELEPHONE (OUTPATIENT)
Dept: INTERNAL MEDICINE CLINIC | Facility: CLINIC | Age: 29
End: 2024-10-18

## 2024-10-18 NOTE — TELEPHONE ENCOUNTER
Pt called to inform that the patch does not work for her if possible to just change to the birth control pills    Please advise

## 2024-10-29 NOTE — TELEPHONE ENCOUNTER
Patient informed via [employer] message of new script sent for: Norethin Ace-Eth Estrad-FE 1-20 MG-MCG Oral Tab.

## 2024-12-02 ENCOUNTER — HOSPITAL ENCOUNTER (OUTPATIENT)
Age: 29
Discharge: HOME | End: 2024-12-02
Payer: COMMERCIAL

## 2024-12-02 ENCOUNTER — APPOINTMENT (OUTPATIENT)
Dept: GENERAL RADIOLOGY | Age: 29
End: 2024-12-02
Attending: NURSE PRACTITIONER
Payer: COMMERCIAL

## 2024-12-02 VITALS
WEIGHT: 247 LBS | HEART RATE: 88 BPM | TEMPERATURE: 99 F | BODY MASS INDEX: 39.7 KG/M2 | OXYGEN SATURATION: 100 % | HEIGHT: 66 IN | SYSTOLIC BLOOD PRESSURE: 141 MMHG | DIASTOLIC BLOOD PRESSURE: 90 MMHG | RESPIRATION RATE: 18 BRPM

## 2024-12-02 DIAGNOSIS — Z20.822 ENCOUNTER FOR LABORATORY TESTING FOR COVID-19 VIRUS: ICD-10-CM

## 2024-12-02 DIAGNOSIS — R50.9 FEVER: Primary | ICD-10-CM

## 2024-12-02 DIAGNOSIS — R07.9 CHEST PAIN OF UNCERTAIN ETIOLOGY: ICD-10-CM

## 2024-12-02 DIAGNOSIS — J06.9 VIRAL URI: ICD-10-CM

## 2024-12-02 LAB
B-HCG UR QL: NEGATIVE
POCT INFLUENZA A: NEGATIVE
POCT INFLUENZA B: NEGATIVE
S PYO AG THROAT QL: NEGATIVE
SARS-COV-2 RNA RESP QL NAA+PROBE: NOT DETECTED

## 2024-12-02 PROCEDURE — 81025 URINE PREGNANCY TEST: CPT | Performed by: NURSE PRACTITIONER

## 2024-12-02 PROCEDURE — U0002 COVID-19 LAB TEST NON-CDC: HCPCS | Performed by: NURSE PRACTITIONER

## 2024-12-02 PROCEDURE — 93000 ELECTROCARDIOGRAM COMPLETE: CPT | Performed by: NURSE PRACTITIONER

## 2024-12-02 PROCEDURE — 71046 X-RAY EXAM CHEST 2 VIEWS: CPT | Performed by: NURSE PRACTITIONER

## 2024-12-02 PROCEDURE — 87502 INFLUENZA DNA AMP PROBE: CPT | Performed by: NURSE PRACTITIONER

## 2024-12-02 PROCEDURE — 87880 STREP A ASSAY W/OPTIC: CPT | Performed by: NURSE PRACTITIONER

## 2024-12-02 PROCEDURE — 99214 OFFICE O/P EST MOD 30 MIN: CPT | Performed by: NURSE PRACTITIONER

## 2024-12-02 RX ORDER — BENZONATATE 100 MG/1
100 CAPSULE ORAL 3 TIMES DAILY PRN
Qty: 30 CAPSULE | Refills: 0 | Status: SHIPPED | OUTPATIENT
Start: 2024-12-02 | End: 2025-01-01

## 2024-12-02 NOTE — ED PROVIDER NOTES
Patient Seen in: [facility]      History     Chief Complaint   Patient presents with    Sore Throat     Possible streptococcus Chest pain - Entered by patient     Stated Complaint: Sore Throat - Possible streptococcus     Subjective:   HPI    29-year-old female presents with sore throat, productive cough with chest pain and coughing.  Patient is afebrile.  She appears in no acute distress.  Patient denies tobacco use.  She denies hormone use.  She is feeling short of breath.      Objective:     Past Medical History:    Visual impairment    eyeglasses              Past Surgical History:   Procedure Laterality Date    Ankle fracture surgery Right     Cholecystectomy      Other      lipoma removal from left lower back                Social History     Socioeconomic History    Marital status: Single   Tobacco Use    Smoking status: Never    Smokeless tobacco: Never   Substance and Sexual Activity    Alcohol use: Yes    Drug use: Never              Review of Systems    Positive for stated complaint: Sore Throat - Possible streptococcus   Other systems are as noted in HPI.  Constitutional and vital signs reviewed.      All other systems reviewed and negative except as noted above.    Physical Exam     ED Triage Vitals [12/02/24 1632]   /90   Pulse 88   Resp 18   Temp 99 °F (37.2 °C)   Temp src Oral   SpO2 100 %   O2 Device None (Room air)       Current Vitals:   Vital Signs  BP: 141/90  Pulse: 88  Resp: 18  Temp: 99 °F (37.2 °C)  Temp src: Oral    Oxygen Therapy  SpO2: 100 %  O2 Device: None (Room air)        Physical Exam  Vitals reviewed.   Constitutional:       General: She is not in acute distress.     Appearance: She is not ill-appearing.   HENT:      Right Ear: Tympanic membrane and ear canal normal.      Left Ear: Tympanic membrane and ear canal normal.      Nose: Congestion present. No rhinorrhea.      Mouth/Throat:      Mouth: Mucous membranes are moist. No oral lesions.      Pharynx:  Posterior oropharyngeal erythema present. No pharyngeal swelling, oropharyngeal exudate or uvula swelling.      Tonsils: No tonsillar exudate or tonsillar abscesses.   Cardiovascular:      Rate and Rhythm: Normal rate and regular rhythm.   Pulmonary:      Effort: Pulmonary effort is normal. No respiratory distress.      Breath sounds: Normal breath sounds. No wheezing or rhonchi.   Chest:      Chest wall: No tenderness.   Musculoskeletal:      Cervical back: Normal range of motion and neck supple.   Lymphadenopathy:      Cervical: No cervical adenopathy.   Skin:     General: Skin is warm and dry.   Neurological:      General: No focal deficit present.      Mental Status: She is alert and oriented to person, place, and time.   Psychiatric:         Mood and Affect: Mood normal.         Behavior: Behavior normal.             ED Course     Labs Reviewed   POCT RAPID STREP - Normal   POCT PREGNANCY URINE - Normal   POCT FLU TEST - Normal    Narrative:     This assay is a rapid molecular in vitro test utilizing nucleic acid amplification of influenza A and B viral RNA.   RAPID SARS-COV-2 BY PCR - Normal     EKG         POCT Flu Test        Specimen Information: Nares; Other      Component Value Flag Ref Range Units Status    POCT INFLUENZA A Negative      Negative  Final    POCT INFLUENZA B Negative      Negative  Final                  Rapid SARS-CoV-2 by PCR        Specimen Information: Nares; Other      Component Value Flag Ref Range Units Status    Rapid SARS-CoV-2 by PCR Not Detected      Not Detected  Final    Comment:    This test is intended for the qualitative detection of nucleic acid from the SARS-CoV-2 viral RNA from individuals who are suspected of COVID-19 infection by their healthcare provider.    A \"Detected\" result is considered a positive test result for COVID-19.   A \"Not Detected\" result for this test means that SARS-CoV-2 RNA was not present in the sample above the limit of detection of the  assay.    Test performed using the Abbott ID NOW COVID-19 assay performed on the ID NOW Instrument, [employer] [place], [place] [address].    This test is being used under the Food and Drug Administration's Emergency Use Authorization.    The authorized Fact Sheet for Healthcare Providers for this assay is available upon request from the laboratory.    [facility]   [address] [place] [address]   Phone: [phone]  Fax: [phone]  CLIA # [id number]                   EKG 12 Lead        Component Value Flag Ref Range Units Status    Ventricular rate 84       BPM Incomplete    Atrial rate 84       BPM Incomplete    P-R Interval 148       ms Incomplete    QRS Duration 88       ms Incomplete    Q-T Interval 348       ms Incomplete    QTC Calculation (Bezet) 411       ms Incomplete    P Axis 26       degrees Incomplete    R Axis 51       degrees Incomplete    T Axis 17       degrees Incomplete                 Normal sinus rhythm  Normal ECG  When compared with ECG of 01-FEB-2023 11:26,  No significant change was found         XR CHEST PA + LAT CHEST (CPT=71046)          PROCEDURE: XR CHEST PA + LAT CHEST (CPT=71046)     COMPARISON: [facility], XR CHEST PA + LAT CHEST (FSH=66101), 3/11/2024, 10:08 AM.     INDICATIONS: Acute cough and SOB.     TECHNIQUE:   Two views.       Findings and impression:  Normal heart size, clear lungs, normal pleura  Finalized by (CST): Aric Hooks MD on 12/02/2024 at 5:23 PM                       POCT Rapid Strep        Component Value Flag Ref Range Units Status    POCT Rapid Strep Negative      Negative  Final                  POCT Pregnancy, Urine        Component Value Flag Ref Range Units Status    POCT Urine Pregnancy Negative      Negative  Final                                MDM              Medical Decision Making  29-year-old female presents with cough sore throat, cough  and a feeling of chest tightness and shortness of breath with coughing.  Differential diagnosis includes viral upper respiratory infection, pneumonia, PE, COVID, strep, influenza.  Patient has unremarkable exam.  EKG was done and shows normal sinus rhythm at a rate of 84.  This is similar to prior in [facility].  POC strep is negative.  POC COVID is negative.  POC influenza is negative.  POC urine pregnancy is negative.  Chest x-ray was done and shows normal chest.  Patient does have a PERC score of 0.  There are no risk factors or abnormal vital signs.  All results were discussed with the patient.  She was diagnosed with a viral upper respiratory infection.  She was given a prescription for Tessalon Perles to help with the cough.  She was also given instructions when to go to the emergency department.    Amount and/or Complexity of Data Reviewed  Labs: ordered.     Details: POC covid is negative  POC strep is negative  POC influenza is negative  POC urine preg is negative  Radiology: ordered.     Details: CXR reviewed by IC provider.  Shows normal chest  ECG/medicine tests: ordered.     Details: EKG shows rate of 84. NSR  Similar to prior in [place]    Risk  OTC drugs.  Prescription drug management.        Disposition and Plan     Clinical Impression:  1. Fever    2. Encounter for laboratory testing for COVID-19 virus    3. Chest pain of uncertain etiology    4. Viral URI         Disposition:  Discharge  12/2/2024  5:31 pm    Follow-up:  Estela Alfonso MD  [address] [facility] [address]  [facility] [address]  [phone]      If symptoms worsen          Medications Prescribed:  Discharge Medication List as of 12/2/2024  5:31 PM        START taking these medications    Details   benzonatate 100 MG Oral Cap Take 1 capsule (100 mg total) by mouth 3 (three) times daily as needed for cough., Normal, Disp-30 capsule, R-0                 Supplementary Documentation:

## 2024-12-02 NOTE — ED INITIAL ASSESSMENT (HPI)
Pt presents to the IC with c/o chest pain since last night, coupled with sore throat, SOB, and fever that started today. Pt states it feels like a weight is on her chest. Also notes that the last time she felt this way, she was dx with strep.

## 2024-12-03 LAB
ATRIAL RATE: 84 BPM
P AXIS: 26 DEGREES
P-R INTERVAL: 148 MS
Q-T INTERVAL: 348 MS
QRS DURATION: 88 MS
QTC CALCULATION (BEZET): 411 MS
R AXIS: 51 DEGREES
T AXIS: 17 DEGREES
VENTRICULAR RATE: 84 BPM

## 2024-12-14 DIAGNOSIS — E66.01 MORBID OBESITY WITH BMI OF 45.0-49.9, ADULT (HCC): ICD-10-CM

## 2024-12-16 RX ORDER — TIRZEPATIDE 7.5 MG/.5ML
7.5 INJECTION, SOLUTION SUBCUTANEOUS WEEKLY
Qty: 4 ML | Refills: 0 | Status: SHIPPED | OUTPATIENT
Start: 2024-12-16

## 2025-01-03 ENCOUNTER — TELEMEDICINE (OUTPATIENT)
Dept: SURGERY | Facility: CLINIC | Age: 30
End: 2025-01-03
Payer: COMMERCIAL

## 2025-01-03 ENCOUNTER — TELEPHONE (OUTPATIENT)
Dept: SURGERY | Facility: CLINIC | Age: 30
End: 2025-01-03

## 2025-01-03 VITALS — WEIGHT: 248 LBS | BODY MASS INDEX: 40 KG/M2

## 2025-01-03 DIAGNOSIS — Z51.81 ENCOUNTER FOR THERAPEUTIC DRUG MONITORING: ICD-10-CM

## 2025-01-03 DIAGNOSIS — R63.2 BINGE EATING: ICD-10-CM

## 2025-01-03 DIAGNOSIS — E78.5 DYSLIPIDEMIA: Primary | ICD-10-CM

## 2025-01-03 DIAGNOSIS — E66.9 OBESITY (BMI 30-39.9): ICD-10-CM

## 2025-01-03 DIAGNOSIS — R06.83 SNORING: ICD-10-CM

## 2025-01-03 RX ORDER — TIRZEPATIDE 10 MG/.5ML
10 INJECTION, SOLUTION SUBCUTANEOUS WEEKLY
Qty: 2 ML | Refills: 1 | Status: SHIPPED | OUTPATIENT
Start: 2025-01-03

## 2025-01-03 NOTE — PROGRESS NOTES
Virtual Video & Audio Check-In    Modesta Gordillo verbally consents to a Virtual Check-In visit on 25.  Patient has been referred to the [facility] website at [email or web address] to review the yearly Consent to Treat document.    Patient understands and accepts financial responsibility for any deductible, co-insurance and/or co-pays associated with this service.    Summary of topics discussed: Obesity/weight management, Lifestyle and behavior modifications, Medication management.    [facility]  [address]  [facility] [address]  Dept: [phone]     Patient:  Modesta Gordillo  :      6/3/1995  MRN:      [id number]    Chief Complaint:    Chief Complaint   Patient presents with    Follow - Up    Obesity    Weight Management       SUBJECTIVE     History of Present Illness:  Modesta is being seen today for a follow-up for medically supported weight loss.    Doing well on Zepbound 7.5 mg weekly.    Initial HPI:  26 yo female.   Reports weight began in  after a break up. Also had additional weight gain at the end of  after loss of her father. Describes self as very thin in adolescence/high school.   Grew up in a single parent household with mom- diet: cereal, frozen meals.    Patient is considering medications and bariatric surgery for weight loss as a last resort.    Has recently been prescribed phentermine by pcp. Minimal weight loss.     Patient denies any history of eating disorder(s).    Patient is employed:  [employer].  Patient lives with mom.    Previous weight loss attempts: nutritionist, cleanses, weight loss programs, food delivery    Patient's goal weight: 170-180 lbs  Biggest weight loss in the past:   How weight loss was achieved: working with nutritionist   Heaviest weight ever:   Previous use of medical weight loss medications: phentermine past month     Physical activity: runs  Power test renewal coming  up    Sleep: adequate hours/night    Sleep screening:     Past Medical History:   Past Medical History:    Visual impairment    eyeglasses        Comorbidities:  Hyperlipidemia-Improvement?  yes    OBJECTIVE     Vitals: Wt 248 lb (112.5 kg)   LMP 11/11/2024 (Exact Date)   BMI 40.03 kg/m²     Initial weight loss: -21   Total weight loss: -26    Start weight: 274    Wt Readings from Last 6 Encounters:   01/03/25 248 lb (112.5 kg)   12/02/24 247 lb (112 kg)   07/30/24 260 lb (117.9 kg)   07/08/24 269 lb (122 kg)   04/24/24 277 lb (125.6 kg)   04/11/24 281 lb (127.5 kg)       Patient Medications:    Current Outpatient Medications   Medication Sig Dispense Refill    Tirzepatide-Weight Management (ZEPBOUND) 10 MG/0.5ML Subcutaneous Solution Auto-injector Inject 10 mg into the skin once a week. 2 mL 1    ZEPBOUND 7.5 MG/0.5ML Subcutaneous Solution Auto-injector INJECT 7.5MG INTO THE SKIN ONCE A WEEK 4 mL 0    Norethin Ace-Eth Estrad-FE 1-20 MG-MCG Oral Tab Take 1 tablet by mouth daily. (Patient not taking: Reported on 12/2/2024) 84 tablet 3     Allergies:  Penicillins, Seasonal, and Latex     Social History:  Reviewed    Surgical History:    Past Surgical History:   Procedure Laterality Date    Ankle fracture surgery Right     Cholecystectomy      Other      lipoma removal from left lower back     Family History:    Family History   Problem Relation Age of Onset    Hypertension Mother     No Known Problems Father     No Known Problems Sister     No Known Problems Brother      Initial Intake:  Current snacks: eggs, nuts, trail mix- time varies  After dinner behavior: -  Night eating: -  Portion sizes: +  Binge: +  Emotional: previously   Depression: Score: 7  Grazing: -  Sweet tooth: -  Crunchy/salty: -  Etoh: Rare, social   Soda Drinker: Yes occasional                         Sports Drinks:  Yes               If yes, how much?:  celcius  Juice:  Yes                 If yes, how much?:  Occasional   Number of restaurant or  fast food meals/week: 0 meals/week     Food Journal  Reviewed and Discussed:       Patient has a Food Journal?: yes   Patient is reading nutrition labels?  yes  Average Caloric Intake:     Average CHO Intake:   Is patient exercising? yes  Type of exercise?   Cardio, Runs 3-4 days/week  5Ks   Minimal strength training     Eating Habits  Patient states the following:  Eats 3 meal(s) per day  Length of time it takes to consume a meal:    # of snacks per day: None  Type of snacks:    Amount of soda consumption per day:  Occasional   Amount of water (in ounces) per day: Adequate   Toughest challenge: what to eat     IF  Tracking protein    Nutritional Goals  Eat 3-4 cups of fresh fruits or vegetables daily    Behavior Modifications Reviewed and Discussed  Eat breakfast, Eat 3 meals per day, Plan meals in advance, Read nutrition labels, Drink 64 oz of water per day, Maintain a daily food journal, Utlize portion control strategies to reduce calorie intake, Identify triggers for eating and manage cues and Eat slowly and take 20 to 30 minutes to complete each meal    Exercise Goals Reviewed and Discussed:    Aim for 150 minutes moderate level exercise weekly with 2-3 days strength training as tolerated      ROS:    Constitutional: negative  Respiratory: positive for dyspnea on exertion  Cardiovascular: negative  Gastrointestinal: negative  Integument/breast: negative  Hematologic/lymphatic: negative  Musculoskeletal:negative  Neurological: negative  Behavioral/Psych: negative  Endocrine: negative  All other systems were reviewed and are negative    Physical Exam:  General: alert, oriented x 3, cooperative, speaking in full sentences, appears stated age and cooperative, obese   Head: Normocephalic, without obvious abnormality, atraumatic  Neck: symmetrical, trachea midline   Lungs: No increased work of breathing   Extremities: extremities normal  Skin: Upper body skin color and texture appear intact       ASSESSMENT      Encounter Diagnosis(ses):   Encounter Diagnoses   Name Primary?    Dyslipidemia Yes    Encounter for therapeutic drug monitoring     Binge eating [R63.2]     Snoring     Obesity (BMI 30-39.9)      PLAN     Diagnoses and all orders for this visit:    Dyslipidemia    Encounter for therapeutic drug monitoring    Binge eating [R63.2]    Snoring    Obesity (BMI 30-39.9)  -     Tirzepatide-Weight Management (ZEPBOUND) 10 MG/0.5ML Subcutaneous Solution Auto-injector; Inject 10 mg into the skin once a week.      DYSLIPIDEMIA: Recommend dietary changes and lifestyle modifications as discussed below. Monitor.     Lab Results   Component Value Date/Time    CHOLEST 157 08/12/2024 10:32 AM     (H) 08/12/2024 10:32 AM    HDL 38 (L) 08/12/2024 10:32 AM    TRIG 67 08/12/2024 10:32 AM    VLDL 11 08/12/2024 10:32 AM     OBESITY/WEIGHT GAIN:     Recommended patient continue intensive lifestyle and behavioral modifications at this time for weight loss.     Reviewed lifestyle modifications: Whole Food/Plant Strong/Low Glycemic Index diet, moderate alcohol consumption, reduced sodium intake to no more than 2,400 mg/day, and at least 150 minutes of moderate physical activity per week.   Avoid processed, poor quality carbohydrates, refined grains, flour, sugar.     Goals for next month:  1. Keep a food log.   2. Drink 64 ounces of non-caloric beverages per day. No fruit juices or regular soda.  3. Aim for 150 minutes moderate exercise per week.    4. Increase fruit and vegetable servings to 5-6 per day.    5. Improve sleep and stress.      Reviewed other labs:  2/1/23- a1c, TSH, and CBC in range.   Fasting glucose, alkaline phosphatase elevated.   Will need bariatric labs if opts for surgery.   8/12/24- TSH, a1c, CMP in range.  WBCs elevated. Monitor.    Discussed medication options for weight loss in detail with patient.      Denies personal or family hx medullary thyroid CA, endocrine neoplasia syndrome, pancreatitis hx,  suicidal ideation. No renal impairment, severe GI disease, diabetes, pancreatitis risks noted.     Binge eating: Improved.     Did not start/fill Wegovy due to ongoing medication shortages in the past.     Continue Zepbound. Current dose 7.5 mg weekly.  Increase to 10 mg weekly at time of refill.   Increase dose monthly as tolerated.   Monitor for nausea closely. Zofran prn.     Has taken phentermine 37.5 mg in the AM and topiramate in the past.    Hold phentermine 37.5 mg in the AM.   Use medication as needed during Zepbound shortages.      SQ administration teaching provided to patient.  Discussed risks, benefits, and side effects of medication.      EKG done 2/1/23.      Recommend sleep consult.      Consider therapist as needed: binge eating.      RD as recommended.   90 grams of protein.     Previously interested in bariatric surgery.   Reviewed with patient the risks and benefits of laparoscopic Sleeve Gastrectomy vs RYGBP.     Attended seminar. HMO insurance.   No longer considering bariatric surgery.      RTC 4 months.      KENISHA Tsang

## 2025-03-13 ENCOUNTER — OFFICE VISIT (OUTPATIENT)
Dept: SURGERY | Facility: CLINIC | Age: 30
End: 2025-03-13
Payer: COMMERCIAL

## 2025-03-13 VITALS
HEART RATE: 93 BPM | WEIGHT: 242.38 LBS | BODY MASS INDEX: 40.88 KG/M2 | SYSTOLIC BLOOD PRESSURE: 112 MMHG | OXYGEN SATURATION: 98 % | DIASTOLIC BLOOD PRESSURE: 80 MMHG | HEIGHT: 64.4 IN

## 2025-03-13 DIAGNOSIS — E66.9 OBESITY (BMI 30-39.9): ICD-10-CM

## 2025-03-13 DIAGNOSIS — R63.2 BINGE EATING: ICD-10-CM

## 2025-03-13 DIAGNOSIS — E78.5 DYSLIPIDEMIA: Primary | ICD-10-CM

## 2025-03-13 DIAGNOSIS — R06.83 SNORING: ICD-10-CM

## 2025-03-13 DIAGNOSIS — Z51.81 ENCOUNTER FOR THERAPEUTIC DRUG MONITORING: ICD-10-CM

## 2025-03-13 PROCEDURE — 99214 OFFICE O/P EST MOD 30 MIN: CPT | Performed by: NURSE PRACTITIONER

## 2025-03-13 PROCEDURE — 3079F DIAST BP 80-89 MM HG: CPT | Performed by: NURSE PRACTITIONER

## 2025-03-13 PROCEDURE — 3074F SYST BP LT 130 MM HG: CPT | Performed by: NURSE PRACTITIONER

## 2025-03-13 PROCEDURE — 3008F BODY MASS INDEX DOCD: CPT | Performed by: NURSE PRACTITIONER

## 2025-03-13 RX ORDER — TIRZEPATIDE 10 MG/.5ML
10 INJECTION, SOLUTION SUBCUTANEOUS WEEKLY
Qty: 2 ML | Refills: 0 | Status: SHIPPED | OUTPATIENT
Start: 2025-03-13

## 2025-03-13 NOTE — PROGRESS NOTES
[facility] [place]  [address] [place] [id number]  Dept: [phone]     Patient:  Modesta Gordillo  :      6/3/1995  MRN:      [id number]    Chief Complaint:    Chief Complaint   Patient presents with    Follow - Up    Weight Management    Obesity       SUBJECTIVE     History of Present Illness:  Modesta is being seen today for a follow-up for medically supported weight loss.    Doing well on Zepbound 10 mg weekly.    Initial HPI:  28 yo female.   Reports weight began in  after a break up. Also had additional weight gain at the end of  after loss of her father. Describes self as very thin in adolescence/high school.   Grew up in a single parent household with mom- diet: cereal, frozen meals.    Patient is considering medications and bariatric surgery for weight loss as a last resort.    Has recently been prescribed phentermine by pcp. Minimal weight loss.     Patient denies any history of eating disorder(s).    Patient is employed:  [employer].  Patient lives with mom.    Previous weight loss attempts: nutritionist, cleanses, weight loss programs, food delivery    Patient's goal weight: 170-180 lbs  Biggest weight loss in the past:   How weight loss was achieved: working with nutritionist   Heaviest weight ever:   Previous use of medical weight loss medications: phentermine past month     Physical activity: runs  Power test renewal coming up    Sleep: adequate hours/night    Sleep screening:     Past Medical History:   Past Medical History:    Visual impairment    eyeglasses        Comorbidities:  Hyperlipidemia-Improvement?  yes    OBJECTIVE     Vitals: /80 (BP Location: Left arm, Patient Position: Sitting, Cuff Size: large)   Pulse 93   Ht 5' 4.4\" (1.636 m)   Wt 242 lb 6.4 oz (110 kg)   LMP 2024 (Exact Date)   SpO2 98%   BMI 41.09 kg/m²     Initial weight loss: -06   Total weight loss: -32   Start  weight: 274   Zepbound SW: 281- 13.88%    Wt Readings from Last 6 Encounters:   03/13/25 242 lb 6.4 oz (110 kg)   01/03/25 248 lb (112.5 kg)   12/02/24 247 lb (112 kg)   07/30/24 260 lb (117.9 kg)   07/08/24 269 lb (122 kg)   04/24/24 277 lb (125.6 kg)       Patient Medications:    Current Outpatient Medications   Medication Sig Dispense Refill    Tirzepatide-Weight Management (ZEPBOUND) 10 MG/0.5ML Subcutaneous Solution Auto-injector Inject 10 mg into the skin once a week. 2 mL 0    Norethin Ace-Eth Estrad-FE 1-20 MG-MCG Oral Tab Take 1 tablet by mouth daily. (Patient not taking: Reported on 12/2/2024) 84 tablet 3     Allergies:  Penicillins, Seasonal, and Latex     Social History:  Reviewed    Surgical History:    Past Surgical History:   Procedure Laterality Date    Ankle fracture surgery Right     Cholecystectomy      Other      lipoma removal from left lower back     Family History:    Family History   Problem Relation Age of Onset    Hypertension Mother     No Known Problems Father     No Known Problems Sister     No Known Problems Brother      Initial Intake:  Current snacks: eggs, nuts, trail mix- time varies  After dinner behavior: -  Night eating: -  Portion sizes: +  Binge: +  Emotional: previously   Depression: Score: 7  Grazing: -  Sweet tooth: -  Crunchy/salty: -  Etoh: Rare, social   Soda Drinker: Yes occasional                         Sports Drinks:  Yes               If yes, how much?:  celcius  Juice:  Yes                 If yes, how much?:  Occasional   Number of restaurant or fast food meals/week: 0 meals/week     Food Journal  Reviewed and Discussed:       Patient has a Food Journal?: yes   Patient is reading nutrition labels?  yes  Average Caloric Intake:     Average CHO Intake:   Is patient exercising? yes  Type of exercise?   Cardio, Runs 3-4 days/week  5Ks   Minimal strength training- add more weights      Eating Habits  Patient states the following:  Eats 3 meal(s) per day  Length of time  it takes to consume a meal:    # of snacks per day: None  Type of snacks:    Amount of soda consumption per day:  Occasional   Amount of water (in ounces) per day: Adequate   Toughest challenge: what to eat     Aim for 90 grams protein/day    Nutritional Goals  Eat 3-4 cups of fresh fruits or vegetables daily    Behavior Modifications Reviewed and Discussed  Eat breakfast, Eat 3 meals per day, Plan meals in advance, Read nutrition labels, Drink 64 oz of water per day, Maintain a daily food journal, Utlize portion control strategies to reduce calorie intake, Identify triggers for eating and manage cues and Eat slowly and take 20 to 30 minutes to complete each meal    Exercise Goals Reviewed and Discussed:    Aim for 150 minutes moderate level exercise weekly with 2-3 days strength training as tolerated      ROS:    Constitutional: negative  Respiratory: positive for dyspnea on exertion  Cardiovascular: negative  Gastrointestinal: negative  Integument/breast: negative  Hematologic/lymphatic: negative  Musculoskeletal:negative  Neurological: negative  Behavioral/Psych: negative  Endocrine: negative  All other systems were reviewed and are negative    Physical Exam:  General appearance: alert, appears stated age, cooperative and obese  Head: Normocephalic, without obvious abnormality, atraumatic  Neck: no adenopathy, no carotid bruit, no JVD, supple, symmetrical, trachea midline and thyroid not enlarged, symmetric, no tenderness/mass/nodules  Lungs: clear to auscultation bilaterally  Heart: S1, S2 normal, no murmur, click, rub or gallop, regular rate and rhythm  Abdomen: soft, non-tender; bowel sounds normal; no masses,  no organomegaly and abdomen obese   Extremities: intact, no edema   Pulses: 2+ and symmetric  Skin: intact   Neurologic: Grossly normal      ASSESSMENT     Encounter Diagnosis(ses):   Encounter Diagnoses   Name Primary?    Dyslipidemia Yes    Encounter for therapeutic drug monitoring     Obesity (BMI  30-39.9)     Binge eating [R63.2]     Snoring      PLAN     Diagnoses and all orders for this visit:    Dyslipidemia    Encounter for therapeutic drug monitoring    Obesity (BMI 30-39.9)  -     Tirzepatide-Weight Management (ZEPBOUND) 10 MG/0.5ML Subcutaneous Solution Auto-injector; Inject 10 mg into the skin once a week.    Binge eating [R63.2]    Snoring      DYSLIPIDEMIA: Recommend dietary changes and lifestyle modifications as discussed below. Monitor.     Lab Results   Component Value Date/Time    CHOLEST 157 08/12/2024 10:32 AM     (H) 08/12/2024 10:32 AM    HDL 38 (L) 08/12/2024 10:32 AM    TRIG 67 08/12/2024 10:32 AM    VLDL 11 08/12/2024 10:32 AM     OBESITY/WEIGHT GAIN:     Recommended patient continue intensive lifestyle and behavioral modifications at this time for weight loss.     Reviewed lifestyle modifications: Whole Food/Plant Strong/Low Glycemic Index diet, moderate alcohol consumption, reduced sodium intake to no more than 2,400 mg/day, and at least 150 minutes of moderate physical activity per week.   Avoid processed, poor quality carbohydrates, refined grains, flour, sugar.     Goals for next month:  1. Keep a food log.   2. Drink 64 ounces of non-caloric beverages per day. No fruit juices or regular soda.  3. Aim for 150 minutes moderate exercise per week.    4. Increase fruit and vegetable servings to 5-6 per day.    5. Improve sleep and stress.      Reviewed other labs:  2/1/23- a1c, TSH, and CBC in range.   Fasting glucose, alkaline phosphatase elevated.   Will need bariatric labs if opts for surgery.   8/12/24- TSH, a1c, CMP in range.  WBCs elevated. Monitor.    Discussed medication options for weight loss in detail with patient.      Denies personal or family hx medullary thyroid CA, endocrine neoplasia syndrome, pancreatitis hx, suicidal ideation. No renal impairment, severe GI disease, diabetes, pancreatitis risks noted.     Binge eating: Improved.     Did not start/fill Wegovy  due to ongoing medication shortages in the past.     Continue Zepbound. Current dose 10 mg weekly.  Increase dose monthly as tolerated.   Patient prefers this dose for now.   Monitor for nausea closely. Zofran prn.   Zepbound SW: 281- 13.88%.    Has taken phentermine 37.5 mg in the AM and topiramate in the past.    Hold phentermine 37.5 mg in the AM.   Use medication as needed during Zepbound shortages.      SQ administration teaching provided to patient.  Discussed risks, benefits, and side effects of medication.      EKG done 2/1/23.      Recommend sleep consult.      Consider therapist as needed: binge eating.      RD as recommended.   90 grams of protein.     Previously interested in bariatric surgery.   Reviewed with patient the risks and benefits of laparoscopic Sleeve Gastrectomy vs RYGBP.     Attended seminar. HMO insurance.   No longer considering bariatric surgery.      RTC 4 months.     KENISHA Tsang

## 2025-05-22 DIAGNOSIS — E66.9 OBESITY (BMI 30-39.9): ICD-10-CM

## 2025-05-23 RX ORDER — TIRZEPATIDE 10 MG/.5ML
10 INJECTION, SOLUTION SUBCUTANEOUS DAILY
Qty: 2 ML | Refills: 1 | Status: SHIPPED | OUTPATIENT
Start: 2025-05-23

## 2025-05-28 DIAGNOSIS — E66.01 MORBID OBESITY WITH BMI OF 45.0-49.9, ADULT (HCC): Primary | ICD-10-CM

## 2025-05-28 RX ORDER — TIRZEPATIDE 12.5 MG/.5ML
12.5 INJECTION, SOLUTION SUBCUTANEOUS WEEKLY
Qty: 2 ML | Refills: 1 | Status: SHIPPED | OUTPATIENT
Start: 2025-05-28 | End: 2025-06-19

## 2025-06-09 ENCOUNTER — TELEPHONE (OUTPATIENT)
Age: 30
End: 2025-06-09

## 2025-06-09 DIAGNOSIS — N92.6 IRREGULAR PERIODS/MENSTRUAL CYCLES: Primary | ICD-10-CM

## 2025-06-09 DIAGNOSIS — E66.01 MORBID OBESITY WITH BMI OF 40.0-44.9, ADULT (HCC): ICD-10-CM

## 2025-06-09 NOTE — TELEPHONE ENCOUNTER
Pt is calling stating that for couples of months now she has experience that she would have her period one week then stop and then again another week of having period. Pt is concern and would like to be seen.       Please call and advise

## 2025-06-11 NOTE — TELEPHONE ENCOUNTER
Pt reporting irregular menses x \"few months\" and wishes to have exam.  No open appts w/ PCP until potentially mid-July as PCP going on extended vacation.    Ref[facility] for pt to [facility] appt[facility] and ER flag for excessive menstrual bleeding or severe abd cramping/pain

## (undated) DIAGNOSIS — Z01.818 PREOP EXAMINATION: ICD-10-CM

## (undated) DIAGNOSIS — K80.10 CALCULUS OF GALLBLADDER WITH CHOLECYSTITIS: ICD-10-CM

## (undated) DEVICE — SOL  .9 1000ML BTL

## (undated) DEVICE — BATTERY

## (undated) DEVICE — CHLORAPREP 26ML APPLICATOR

## (undated) DEVICE — ENCORE® LATEX ACCLAIM SIZE 8, STERILE LATEX POWDER-FREE SURGICAL GLOVE: Brand: ENCORE

## (undated) DEVICE — LOWER EXTREMITY: Brand: MEDLINE INDUSTRIES, INC.

## (undated) DEVICE — 3M™ IOBAN™ 2 ANTIMICROBIAL INCISE DRAPE 6650EZ: Brand: IOBAN™ 2

## (undated) DEVICE — TOURNIQUET CUFF 34 STR

## (undated) DEVICE — SUTURE ETHILON 3-0 669H

## (undated) DEVICE — GAMMEX® PI HYBRID SIZE 6.5, STERILE POWDER-FREE SURGICAL GLOVE, POLYISOPRENE AND NEOPRENE BLEND: Brand: GAMMEX

## (undated) DEVICE — SUTURE ETHILON 4-0 662G

## (undated) DEVICE — SUCTION CANISTER, 3000CC,SAFELINER: Brand: DEROYAL

## (undated) DEVICE — SOL H2O 1000ML BTL

## (undated) DEVICE — TETRA-FLEX CF WOVEN LATEX FREE ELASTIC BANDAGE 4" X 5.5 YD: Brand: TETRA-FLEX™CF

## (undated) DEVICE — COVER SGL STRL LGHT HNDL BLU

## (undated) DEVICE — INTENDED FOR TISSUE SEPARATION, AND OTHER PROCEDURES THAT REQUIRE A SHARP SURGICAL BLADE TO PUNCTURE OR CUT.: Brand: BARD-PARKER ® STAINLESS STEEL BLADES

## (undated) DEVICE — WEBRIL COTTON UNDERCAST PADDING: Brand: WEBRIL

## (undated) DEVICE — GAUZE SPONGES,12 PLY: Brand: CURITY

## (undated) DEVICE — DRILL BIT SYNT 2.5X110 310.25

## (undated) DEVICE — UNDYED BRAIDED (POLYGLACTIN 910), SYNTHETIC ABSORBABLE SUTURE: Brand: COATED VICRYL

## (undated) DEVICE — REM POLYHESIVE ADULT PATIENT RETURN ELECTRODE: Brand: VALLEYLAB

## (undated) DEVICE — DRAPE C-ARM UNIVERSAL

## (undated) DEVICE — COTTON UNDERCAST PADDING,REGULAR FINISH: Brand: WEBRIL

## (undated) DEVICE — Device

## (undated) NOTE — LETTER
Date & Time: 3/11/2024, 10:40 AM  Patient: Modesta Gordillo  Encounter Provider(s):    Nuha Ferrara APRN       To Whom It May Concern:    Modesta Gordillo was seen and treated in our department on 3/11/2024. She should not return to work until 3/12/2024 .    If you have any questions or concerns, please do not hesitate to call.        _____________________________  Physician/APC Signature

## (undated) NOTE — Clinical Note
Hello, I met with Franko Gagnon in clinic today for weight loss/management. I have recommended intensive lifestyle/behavioral modifications for weight loss. In addition, I have recommended continuing weight loss medication to help support her efforts. She will meet with our dietician and consider counseling for binge eating behaviors. Please let me know if you have any questions or concerns. Thank you very much for referring your patient to our clinic.    Take good care, Darlina Bence, APRN

## (undated) NOTE — ED AVS SNAPSHOT
Jayy Allen   MRN: [id number]    Department:  [facility] Emergency Department   Date of Visit:  12/16/2019           Disclosure     Insurance plans vary and the physician(s) referred by the ER may not be covered by your plan.  Please contact y CARE PHYSICIAN AT ONCE OR RETURN IMMEDIATELY TO THE EMERGENCY DEPARTMENT. If you have been prescribed any medication(s), please fill your prescription right away and begin taking the medication(s) as directed.   If you believe that any of the medications